# Patient Record
Sex: MALE | HISPANIC OR LATINO | Employment: STUDENT | URBAN - METROPOLITAN AREA
[De-identification: names, ages, dates, MRNs, and addresses within clinical notes are randomized per-mention and may not be internally consistent; named-entity substitution may affect disease eponyms.]

---
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
## 2022-02-17 ENCOUNTER — TELEPHONE (OUTPATIENT)
Dept: CARDIOLOGY | Facility: CLINIC | Age: 22
End: 2022-02-17
Payer: COMMERCIAL

## 2022-02-17 NOTE — TELEPHONE ENCOUNTER
Triage RN attempted to reach patient per request to call him to discuss recent chest discomfort per "[employer]t message. Call was forwarded to a full VM box. Scheduling is attempting reach patient to discuss scheduling a soon appointment. MARY De La Cruz RN.

## 2022-03-16 ENCOUNTER — LAB (OUTPATIENT)
Dept: LAB | Facility: CLINIC | Age: 22
End: 2022-03-16
Payer: COMMERCIAL

## 2022-03-16 ENCOUNTER — OFFICE VISIT (OUTPATIENT)
Dept: CARDIOLOGY | Facility: CLINIC | Age: 22
End: 2022-03-16
Payer: COMMERCIAL

## 2022-03-16 VITALS
OXYGEN SATURATION: 100 % | BODY MASS INDEX: 22.63 KG/M2 | HEART RATE: 67 BPM | DIASTOLIC BLOOD PRESSURE: 74 MMHG | SYSTOLIC BLOOD PRESSURE: 116 MMHG | WEIGHT: 136 LBS

## 2022-03-16 DIAGNOSIS — R00.2 PALPITATIONS: Primary | ICD-10-CM

## 2022-03-16 DIAGNOSIS — R00.2 PALPITATIONS: ICD-10-CM

## 2022-03-16 LAB
ANION GAP SERPL CALCULATED.3IONS-SCNC: 5 MMOL/L (ref 3–14)
BUN SERPL-MCNC: 11 MG/DL (ref 7–30)
CALCIUM SERPL-MCNC: 9 MG/DL (ref 8.5–10.1)
CHLORIDE BLD-SCNC: 103 MMOL/L (ref 98–110)
CO2 SERPL-SCNC: 30 MMOL/L (ref 20–32)
CREAT SERPL-MCNC: 0.74 MG/DL (ref 0.5–1)
ERYTHROCYTE [DISTWIDTH] IN BLOOD BY AUTOMATED COUNT: 12.3 % (ref 10–15)
GFR SERPL CREATININE-BSD FRML MDRD: >90 ML/MIN/1.73M2
GLUCOSE BLD-MCNC: 90 MG/DL (ref 70–99)
HCT VFR BLD AUTO: 41.9 % (ref 40–53)
HGB BLD-MCNC: 13.8 G/DL (ref 13.3–17.7)
MCH RBC QN AUTO: 31 PG (ref 26.5–33)
MCHC RBC AUTO-ENTMCNC: 32.9 G/DL (ref 31.5–36.5)
MCV RBC AUTO: 94 FL (ref 78–100)
PLATELET # BLD AUTO: 216 10E3/UL (ref 150–450)
POTASSIUM BLD-SCNC: 3.7 MMOL/L (ref 3.4–5.3)
RBC # BLD AUTO: 4.45 10E6/UL (ref 4.4–5.9)
SODIUM SERPL-SCNC: 138 MMOL/L (ref 133–144)
WBC # BLD AUTO: 3.8 10E3/UL (ref 4–11)

## 2022-03-16 PROCEDURE — 80048 BASIC METABOLIC PNL TOTAL CA: CPT | Performed by: PHYSICIAN ASSISTANT

## 2022-03-16 PROCEDURE — 99214 OFFICE O/P EST MOD 30 MIN: CPT | Performed by: PHYSICIAN ASSISTANT

## 2022-03-16 PROCEDURE — 36415 COLL VENOUS BLD VENIPUNCTURE: CPT | Performed by: PHYSICIAN ASSISTANT

## 2022-03-16 PROCEDURE — 85027 COMPLETE CBC AUTOMATED: CPT | Performed by: PHYSICIAN ASSISTANT

## 2022-03-16 NOTE — PROGRESS NOTES
Primary Cardiologist: Dr. Garcia    Reason for Visit: Palpitations, SOB, and lightheadedness    History of Present Illness:   Donald is a pleasant 21 year old male with past medical history notable for anxiety.  He was referred to Dr. Garcia in 9/2021 with atypical chest pain.  At that time he had an unremarkable work-up.  His echocardiogram showed no structural abnormalities.  Zio patch monitor showed baseline rhythm of normal sinus rhythm.  During the times he was symptomatic there appeared to be no arrhythmias noted.  His baseline blood work was unremarkable.  He was referred back to PCP for noncardiac causes of his symptoms.    Donald presents to clinic today for further discussion of palpitations, shortness of breath, lightheadedness.  He tells me that despite being on medications for anxiety his symptoms have not changed or improved in any way.  Emotionally he feels like he is at a better place but he continues to have physical symptoms.  His symptoms can come on anytime with no particular pattern.  He mainly reports shortness of breath, lightheadedness, and palpitations.  He feels like his heart was racing.  He does not have chest discomfort.  He denies any bleeding issues or syncope.  He denies binge drinking, tobacco use, or drug use.  He is currently attending [employer] studying biology.  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  He is quite busy with his class work and feels that he is under a great deal of stress.  Overall he feels like his symptoms are not getting worse but they are not improving despite him receiving therapy for anxiety.  He would like to make sure that he does not have any type of arrhythmia.  He does not have first-degree family history of heart conditions but tells me that 2 of his great aunts passed away due to heart related conditions.  They were living in [place] at the time and is hard to know what the specific details were.    Assessment and  Plan:  Donald is a pleasant 21 year old male with past medical history notable for anxiety, GERD, and persistent symptoms of shortness of breath, palpitations, and lightheadedness.    His symptoms can come on at any time.  There is no particular association to postural changes.  He has no significant risk factors.  He had unremarkable work-up about 6 months ago.  We have completed orthostatic vitals during today's visit.  He did not have significant drop in the systolic blood pressure with diastolic blood pressure from supine position to sitting and standing.  His orthostatic pulse did not show a significant rise from supine to standing as well.  There is a possibility that he may have postural orthostatic tachycardia syndrome but again his symptoms are not postural.  At this time we will repeat a 14-day Zio patch monitor to capture these episodes.  I will also have him stop by lab today for baseline blood work.     35 minutes spent on the date of the encounter with chart review, patient visit, care coordination, and documentation.      This note was completed in part using Dragon voice recognition software. Although reviewed after completion, some word and grammatical errors may occur.    Orders this Visit:  Orders Placed This Encounter   Procedures     Basic metabolic panel     CBC with platelets     Leadless EKG Monitor 8 to 14 Days     No orders of the defined types were placed in this encounter.    There are no discontinued medications.      Encounter Diagnosis   Name Primary?     Palpitations Yes       CURRENT MEDICATIONS:  Current Outpatient Medications   Medication Sig Dispense Refill     ALLERGY RELIEF 180 MG tablet Take 180 mg by mouth daily       citalopram (CELEXA) 20 MG tablet Take 1 tablet (20 mg) by mouth daily 90 tablet 1       ALLERGIES     Allergies   Allergen Reactions     Chlorhexidine Rash     Dust Mite Extract Rash       PAST MEDICAL HISTORY:  No past medical history on file.    PAST SURGICAL  HISTORY:  No past surgical history on file.    FAMILY HISTORY:  No family history on file.    SOCIAL HISTORY:  Social History     Socioeconomic History     Marital status: Single     Spouse name: None     Number of children: None     Years of education: None     Highest education level: None   Occupational History     None   Tobacco Use     Smoking status: Never Smoker     Smokeless tobacco: Never Used   Substance and Sexual Activity     Alcohol use: Never     Drug use: Never     Sexual activity: None   Other Topics Concern     Parent/sibling w/ CABG, MI or angioplasty before 65F 55M? Not Asked   Social History Narrative     None     Social Determinants of Health     Financial Resource Strain: Not on file   Food Insecurity: Not on file   Transportation Needs: Not on file   Physical Activity: Not on file   Stress: Not on file   Social Connections: Not on file   Intimate Partner Violence: Not on file   Housing Stability: Not on file       Review of Systems:  Skin:  Negative     Eyes:  Positive for glasses  ENT:  Negative    Respiratory:  Positive for dyspnea on exertion;dyspnea at rest  Cardiovascular:    palpitations;Positive for;chest pain;heaviness;fatigue;lightheadedness;dizziness  Gastroenterology: Positive for heartburn;nausea  Genitourinary:  Negative    Musculoskeletal:  Negative    Neurologic:  Negative    Psychiatric:  Positive for depression;anxiety  Heme/Lymph/Imm:  Positive for allergies  Endocrine:  Negative      Physical Exam:  Vitals: /73   Pulse 78   Wt 61.7 kg (136 lb)   SpO2 100%   BMI 22.63 kg/m       GEN:  NAD  NECK: No JVD  C/V:  Regular rate and rhythm, no murmur, rub or gallop.  RESP: Clear to auscultation bilaterally.  GI: Abdomen soft, nontender, nondistended.   EXTREM: No pitting LE edema.   NEURO: Alert and oriented, cooperative. No obvious focal deficits.   PSYCH: Normal affect.  SKIN: Warm and dry.       Recent Lab Results:  LIPID RESULTS:  Lab Results   Component Value Date     CHOL 125 07/06/2021    HDL 40 (L) 07/06/2021    LDL 72 07/06/2021    TRIG 63 07/06/2021       LIVER ENZYME RESULTS:  Lab Results   Component Value Date    AST 14 07/05/2021    ALT 25 07/05/2021       CBC RESULTS:  Lab Results   Component Value Date    WBC 5.7 09/17/2021    WBC 8.0 07/05/2021    RBC 4.75 09/17/2021    RBC 5.00 07/05/2021    HGB 14.5 09/17/2021    HGB 15.6 07/05/2021    HCT 43.3 09/17/2021    HCT 45.8 07/05/2021    MCV 91 09/17/2021    MCV 92 07/05/2021    MCH 30.5 09/17/2021    MCH 31.2 07/05/2021    MCHC 33.5 09/17/2021    MCHC 34.1 07/05/2021    RDW 12.5 09/17/2021    RDW 12.2 07/05/2021     09/17/2021     07/05/2021       BMP RESULTS:  Lab Results   Component Value Date     09/17/2021     07/05/2021    POTASSIUM 3.6 09/17/2021    POTASSIUM 3.8 07/05/2021    CHLORIDE 108 09/17/2021    CHLORIDE 105 07/05/2021    CO2 30 09/17/2021    CO2 29 07/05/2021    ANIONGAP 1 (L) 09/17/2021    ANIONGAP 4 07/05/2021    GLC 91 09/17/2021    GLC 80 07/05/2021    BUN 7 09/17/2021    BUN 7 07/05/2021    CR 0.90 09/17/2021    CR 0.85 07/05/2021    GFRESTIMATED >90 09/17/2021    GFRESTIMATED >90 07/05/2021    GFRESTBLACK >90 07/05/2021    NAVEED 9.7 09/17/2021    NAVEED 9.3 07/05/2021        A1C RESULTS:  No results found for: A1C    INR RESULTS:  No results found for: INR        Jihan Farrell PA-C  March 16, 2022

## 2022-03-16 NOTE — LETTER
3/16/2022    Efren Alvarado MD  [address] [facility] [address]    RE: Donald Laughlin       Dear Colleague,     I had the pleasure of seeing Donald Laughlin in the [facility].  Primary Cardiologist: Dr. Garcia    Reason for Visit: Palpitations, SOB, and lightheadedness    History of Present Illness:   Donald is a pleasant 21 year old male with past medical history notable for anxiety.  He was referred to Dr. Garcia in 9/2021 with atypical chest pain.  At that time he had an unremarkable work-up.  His echocardiogram showed no structural abnormalities.  Zio patch monitor showed baseline rhythm of normal sinus rhythm.  During the times he was symptomatic there appeared to be no arrhythmias noted.  His baseline blood work was unremarkable.  He was referred back to PCP for noncardiac causes of his symptoms.    Donald presents to clinic today for further discussion of palpitations, shortness of breath, lightheadedness.  He tells me that despite being on medications for anxiety his symptoms have not changed or improved in any way.  Emotionally he feels like he is at a better place but he continues to have physical symptoms.  His symptoms can come on anytime with no particular pattern.  He mainly reports shortness of breath, lightheadedness, and palpitations.  He feels like his heart was racing.  He does not have chest discomfort.  He denies any bleeding issues or syncope.  He denies binge drinking, tobacco use, or drug use.  He is currently attending [employer] studying biology.  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  He is quite busy with his class work and feels that he is under a great deal of stress.  Overall he feels like his symptoms are not getting worse but they are not improving despite him receiving therapy for anxiety.  He would like to make sure that he does not have any type of arrhythmia.  He does not have first-degree family history of  heart conditions but tells me that 2 of his great aunts passed away due to heart related conditions.  They were living in [place] at the time and is hard to know what the specific details were.    Assessment and Plan:  Donald is a pleasant 21 year old male with past medical history notable for anxiety, GERD, and persistent symptoms of shortness of breath, palpitations, and lightheadedness.    His symptoms can come on at any time.  There is no particular association to postural changes.  He has no significant risk factors.  He had unremarkable work-up about 6 months ago.  We have completed orthostatic vitals during today's visit.  He did not have significant drop in the systolic blood pressure with diastolic blood pressure from supine position to sitting and standing.  His orthostatic pulse did not show a significant rise from supine to standing as well.  There is a possibility that he may have postural orthostatic tachycardia syndrome but again his symptoms are not postural.  At this time we will repeat a 14-day Zio patch monitor to capture these episodes.  I will also have him stop by lab today for baseline blood work.     35 minutes spent on the date of the encounter with chart review, patient visit, care coordination, and documentation.      This note was completed in part using Dragon voice recognition software. Although reviewed after completion, some word and grammatical errors may occur.    Orders this Visit:  Orders Placed This Encounter   Procedures     Basic metabolic panel     CBC with platelets     Leadless EKG Monitor 8 to 14 Days     No orders of the defined types were placed in this encounter.    There are no discontinued medications.      Encounter Diagnosis   Name Primary?     Palpitations Yes       CURRENT MEDICATIONS:  Current Outpatient Medications   Medication Sig Dispense Refill     ALLERGY RELIEF 180 MG tablet Take 180 mg by mouth daily       citalopram (CELEXA) 20 MG tablet Take 1 tablet (20  mg) by mouth daily 90 tablet 1       ALLERGIES     Allergies   Allergen Reactions     Chlorhexidine Rash     Dust Mite Extract Rash       PAST MEDICAL HISTORY:  No past medical history on file.    PAST SURGICAL HISTORY:  No past surgical history on file.    FAMILY HISTORY:  No family history on file.    SOCIAL HISTORY:  Social History     Socioeconomic History     Marital status: Single     Spouse name: None     Number of children: None     Years of education: None     Highest education level: None   Occupational History     None   Tobacco Use     Smoking status: Never Smoker     Smokeless tobacco: Never Used   Substance and Sexual Activity     Alcohol use: Never     Drug use: Never     Sexual activity: None   Other Topics Concern     Parent/sibling w/ CABG, MI or angioplasty before 65F 55M? Not Asked   Social History Narrative     None     Social Determinants of Health     Financial Resource Strain: Not on file   Food Insecurity: Not on file   Transportation Needs: Not on file   Physical Activity: Not on file   Stress: Not on file   Social Connections: Not on file   Intimate Partner Violence: Not on file   Housing Stability: Not on file       Review of Systems:  Skin:  Negative     Eyes:  Positive for glasses  ENT:  Negative    Respiratory:  Positive for dyspnea on exertion;dyspnea at rest  Cardiovascular:    palpitations;Positive for;chest pain;heaviness;fatigue;lightheadedness;dizziness  Gastroenterology: Positive for heartburn;nausea  Genitourinary:  Negative    Musculoskeletal:  Negative    Neurologic:  Negative    Psychiatric:  Positive for depression;anxiety  Heme/Lymph/Imm:  Positive for allergies  Endocrine:  Negative      Physical Exam:  Vitals: /73   Pulse 78   Wt 61.7 kg (136 lb)   SpO2 100%   BMI 22.63 kg/m       GEN:  NAD  NECK: No JVD  C/V:  Regular rate and rhythm, no murmur, rub or gallop.  RESP: Clear to auscultation bilaterally.  GI: Abdomen soft, nontender, nondistended.   EXTREM: No  pitting LE edema.   NEURO: Alert and oriented, cooperative. No obvious focal deficits.   PSYCH: Normal affect.  SKIN: Warm and dry.       Recent Lab Results:  LIPID RESULTS:  Lab Results   Component Value Date    CHOL 125 07/06/2021    HDL 40 (L) 07/06/2021    LDL 72 07/06/2021    TRIG 63 07/06/2021       LIVER ENZYME RESULTS:  Lab Results   Component Value Date    AST 14 07/05/2021    ALT 25 07/05/2021       CBC RESULTS:  Lab Results   Component Value Date    WBC 5.7 09/17/2021    WBC 8.0 07/05/2021    RBC 4.75 09/17/2021    RBC 5.00 07/05/2021    HGB 14.5 09/17/2021    HGB 15.6 07/05/2021    HCT 43.3 09/17/2021    HCT 45.8 07/05/2021    MCV 91 09/17/2021    MCV 92 07/05/2021    MCH 30.5 09/17/2021    MCH 31.2 07/05/2021    MCHC 33.5 09/17/2021    MCHC 34.1 07/05/2021    RDW 12.5 09/17/2021    RDW 12.2 07/05/2021     09/17/2021     07/05/2021       BMP RESULTS:  Lab Results   Component Value Date     09/17/2021     07/05/2021    POTASSIUM 3.6 09/17/2021    POTASSIUM 3.8 07/05/2021    CHLORIDE 108 09/17/2021    CHLORIDE 105 07/05/2021    CO2 30 09/17/2021    CO2 29 07/05/2021    ANIONGAP 1 (L) 09/17/2021    ANIONGAP 4 07/05/2021    GLC 91 09/17/2021    GLC 80 07/05/2021    BUN 7 09/17/2021    BUN 7 07/05/2021    CR 0.90 09/17/2021    CR 0.85 07/05/2021    GFRESTIMATED >90 09/17/2021    GFRESTIMATED >90 07/05/2021    GFRESTBLACK >90 07/05/2021    NAVEED 9.7 09/17/2021    NAVEED 9.3 07/05/2021        A1C RESULTS:  No results found for: A1C    INR RESULTS:  No results found for: INR    Aybike Bakan-Carter, PA-C  March 16, 2022     Thank you for allowing me to participate in the care of your patient.      Sincerely,     Jihan Farrell PA-C     [facility]  cc:   Korin Garcia MD  [address] [address]

## 2022-03-16 NOTE — PATIENT INSTRUCTIONS
Today's Plan:   1) Schedule 14-day heart monitor to find out if any of your episodes are concerning for a heart rhythm issue.   2) I will send message on [employer] about your results.     If you have questions or concerns please call my nurse team at [phone].     Scheduling phone number: [phone].  Reminder: Please bring in all current medications, over the counter supplements and vitamin bottles to your next appointment.    It was a pleasure seeing you today!     Jihan Farrell PA-C  3/16/2022

## 2022-03-22 ENCOUNTER — HOSPITAL ENCOUNTER (OUTPATIENT)
Dept: CARDIOLOGY | Facility: CLINIC | Age: 22
Discharge: HOME | End: 2022-03-22
Attending: PHYSICIAN ASSISTANT | Admitting: PHYSICIAN ASSISTANT
Payer: COMMERCIAL

## 2022-03-22 DIAGNOSIS — R00.2 PALPITATIONS: ICD-10-CM

## 2022-03-22 PROCEDURE — 93248 EXT ECG>7D<15D REV&INTERPJ: CPT | Performed by: INTERNAL MEDICINE

## 2022-03-22 PROCEDURE — 93246 EXT ECG>7D<15D RECORDING: CPT

## 2022-03-28 ENCOUNTER — IMMUNIZATION (OUTPATIENT)
Dept: NURSING | Facility: CLINIC | Age: 22
End: 2022-03-28
Payer: COMMERCIAL

## 2022-03-28 PROCEDURE — 91305 COVID-19,PF,PFIZER (12+ YRS): CPT

## 2022-03-28 PROCEDURE — 0054A COVID-19,PF,PFIZER (12+ YRS): CPT

## 2022-04-24 ENCOUNTER — MYC MEDICAL ADVICE (OUTPATIENT)
Dept: CARDIOLOGY | Facility: CLINIC | Age: 22
End: 2022-04-24
Payer: COMMERCIAL

## 2022-04-25 NOTE — TELEPHONE ENCOUNTER
"Mychart message from patient routed to [employer]. Hx palpitations/lightheadedness/dyspnea.    \"Hi,     I noticed my white blood cell count was lower than the range, could this have anything to do with my symptoms? Or any explanation why I get chest pain, shortness of breath, dizziness, palpitations even though all my cardiac tests come back good?\"   "

## 2022-04-27 ENCOUNTER — ALLIED HEALTH/NURSE VISIT (OUTPATIENT)
Dept: INTERNAL MEDICINE | Facility: CLINIC | Age: 22
End: 2022-04-27
Payer: COMMERCIAL

## 2022-04-27 DIAGNOSIS — Z23 NEED FOR TDAP VACCINATION: Primary | ICD-10-CM

## 2022-04-27 PROCEDURE — 90715 TDAP VACCINE 7 YRS/> IM: CPT

## 2022-04-27 PROCEDURE — 90471 IMMUNIZATION ADMIN: CPT

## 2022-04-27 PROCEDURE — 99207 PR NO CHARGE NURSE ONLY: CPT

## 2022-04-27 NOTE — NURSING NOTE
Prior to immunization administration, verified patients identity using patient s name and date of birth. Please see Immunization Activity for additional information.     Screening Questionnaire for Adult Immunization    Are you sick today?   No   Do you have allergies to medications, food, a vaccine component or latex?   No   Have you ever had a serious reaction after receiving a vaccination?   No   Do you have a long-term health problem with heart, lung, kidney, or metabolic disease (e.g., diabetes), asthma, a blood disorder, no spleen, complement component deficiency, a cochlear implant, or a spinal fluid leak?  Are you on long-term aspirin therapy?   No   Do you have cancer, leukemia, HIV/AIDS, or any other immune system problem?   No   Do you have a parent, brother, or sister with an immune system problem?   No   In the past 3 months, have you taken medications that affect  your immune system, such as prednisone, other steroids, or anticancer drugs; drugs for the treatment of rheumatoid arthritis, Crohn s disease, or psoriasis; or have you had radiation treatments?   No   Have you had a seizure, or a brain or other nervous system problem?   No   During the past year, have you received a transfusion of blood or blood    products, or been given immune (gamma) globulin or antiviral drug?   No   For women: Are you pregnant or is there a chance you could become       pregnant during the next month?   No   Have you received any vaccinations in the past 4 weeks?   No     Immunization questionnaire answers were all negative.        Per orders of Dr. Alvarado, injection of TDAP given by Emily Waggoner. Patient instructed to remain in clinic for 15 minutes afterwards, and to report any adverse reaction to me immediately.       Screening performed by Emily Waggoner on 4/27/2022 at 3:07 PM.

## 2022-04-27 NOTE — TELEPHONE ENCOUNTER
Jihan's reply routed to patient via Jihan Berry PA-C  You 8 minutes ago (2:32 PM)     Monitor was normal during the times he had these symptoms. This could be anxiety- recommend he follows with PCP about this as well as questions on white count.     Thanks,     Jihan

## 2022-06-27 DIAGNOSIS — F41.1 GAD (GENERALIZED ANXIETY DISORDER): ICD-10-CM

## 2022-06-29 RX ORDER — CITALOPRAM HYDROBROMIDE 20 MG/1
TABLET ORAL
Qty: 90 TABLET | Refills: 0 | Status: SHIPPED | OUTPATIENT
Start: 2022-06-29 | End: 2022-10-23

## 2022-07-19 ENCOUNTER — OFFICE VISIT (OUTPATIENT)
Dept: PODIATRY | Facility: CLINIC | Age: 22
End: 2022-07-19
Payer: COMMERCIAL

## 2022-07-19 VITALS
SYSTOLIC BLOOD PRESSURE: 114 MMHG | BODY MASS INDEX: 21.66 KG/M2 | DIASTOLIC BLOOD PRESSURE: 70 MMHG | WEIGHT: 130 LBS | HEIGHT: 65 IN

## 2022-07-19 DIAGNOSIS — B35.1 ONYCHOMYCOSIS: Primary | ICD-10-CM

## 2022-07-19 PROCEDURE — 99203 OFFICE O/P NEW LOW 30 MIN: CPT | Performed by: PODIATRIST

## 2022-07-19 NOTE — PROGRESS NOTES
"Assessment:      ICD-10-CM    1. Onychomycosis  B35.1 Adult Dermatology Referral          Plan:  Orders Placed This Encounter   Procedures     Adult Dermatology Referral       Discussed the etiology and treatment of the condition with the patient.  Discussed treatment options.    Recommend referral to dermatology for definitive biopsy & treatment.    I can remove permanently     Return:  Dermatology / foot care nurse     Pily Khan DPM                Chief Complaint:     Patient presents with:  Right Foot - Toe Pain     Fungus    HPI:  Donald Laughlin is a 22 year old year old male who presents for evaluation of fungus.    Pt states he has tried nothing, has not been to his PCP      Past Medical & Surgical History:  No past medical history on file.   No past surgical history on file.   No family history on file.     Social History:  ?  History   Smoking Status     Never Smoker   Smokeless Tobacco     Never Used     History   Drug Use Unknown     Social History    Substance and Sexual Activity      Alcohol use: Never      Allergies:  ?   Allergies   Allergen Reactions     Chlorhexidine Rash     Dust Mite Extract Rash        Medications:    Current Outpatient Medications   Medication     ALLERGY RELIEF 180 MG tablet     citalopram (CELEXA) 20 MG tablet     No current facility-administered medications for this visit.       Physical Exam:  ?  Vitals:  /70   Ht 1.651 m (5' 5\")   Wt 59 kg (130 lb)   BMI 21.63 kg/m     General:  WD/WN, in NAD.  A&O x3.  Dermatologic:    Skin is intact, open lesions absent.  Fungus present  bilateral.  Vascular:  Pulses palpable bilateral.  Digital capillary refill time normal bilateral.  Generalized edema- none bilateral.  Neurologic:    Gross sensation normal.  Gait and balance normal.  Musculoskeletal:  No pain to palpation of foot & ankle.  aROM intact to foot & ankle.  Muscle strength 5/5 foot & ankle.              "

## 2022-07-19 NOTE — PATIENT INSTRUCTIONS
PATIENT INSTRUCTIONS - Podiatry / Foot & Ankle Surgery    Dermatology referral today for fungus treatment      I can permanently remove the nail if desired

## 2022-07-19 NOTE — LETTER
"    7/19/2022         RE: Donald Laughlin  [address] [facility] [address]        Dear Colleague,    Thank you for referring your patient, Donald Laughlin, to the [facility]. Please see a copy of my visit note below.    Assessment:      ICD-10-CM    1. Onychomycosis  B35.1 Adult Dermatology Referral          Plan:  Orders Placed This Encounter   Procedures     Adult Dermatology Referral       Discussed the etiology and treatment of the condition with the patient.  Discussed treatment options.    Recommend referral to dermatology for definitive biopsy & treatment.    I can remove permanently     Return:  Dermatology / foot care nurse     Pily Khan DPM                Chief Complaint:     Patient presents with:  Right Foot - Toe Pain     Fungus    HPI:  Donald Laughlin is a 22 year old year old male who presents for evaluation of fungus.    Pt states he has tried nothing, has not been to his PCP      Past Medical & Surgical History:  No past medical history on file.   No past surgical history on file.   No family history on file.     Social History:  ?  History   Smoking Status     Never Smoker   Smokeless Tobacco     Never Used     History   Drug Use Unknown     Social History    Substance and Sexual Activity      Alcohol use: Never      Allergies:  ?   Allergies   Allergen Reactions     Chlorhexidine Rash     Dust Mite Extract Rash        Medications:    Current Outpatient Medications   Medication     ALLERGY RELIEF 180 MG tablet     citalopram (CELEXA) 20 MG tablet     No current facility-administered medications for this visit.       Physical Exam:  ?  Vitals:  /70   Ht 1.651 m (5' 5\")   Wt 59 kg (130 lb)   BMI 21.63 kg/m     General:  WD/WN, in NAD.  A&O x3.  Dermatologic:    Skin is intact, open lesions absent.  Fungus present  bilateral.  Vascular:  Pulses palpable bilateral.  Digital capillary refill time normal bilateral.  Generalized " edema- none bilateral.  Neurologic:    Gross sensation normal.  Gait and balance normal.  Musculoskeletal:  No pain to palpation of foot & ankle.  aROM intact to foot & ankle.  Muscle strength 5/5 foot & ankle.                  Again, thank you for allowing me to participate in the care of your patient.        Sincerely,        Pily Khan DPM

## 2022-07-27 ENCOUNTER — OFFICE VISIT (OUTPATIENT)
Dept: INTERNAL MEDICINE | Facility: CLINIC | Age: 22
End: 2022-07-27
Payer: COMMERCIAL

## 2022-07-27 VITALS
OXYGEN SATURATION: 96 % | HEART RATE: 74 BPM | WEIGHT: 134.2 LBS | SYSTOLIC BLOOD PRESSURE: 113 MMHG | TEMPERATURE: 98.6 F | DIASTOLIC BLOOD PRESSURE: 73 MMHG | BODY MASS INDEX: 22.36 KG/M2 | HEIGHT: 65 IN

## 2022-07-27 DIAGNOSIS — R35.0 URINARY FREQUENCY: ICD-10-CM

## 2022-07-27 DIAGNOSIS — M25.552 PELVIC JOINT PAIN, LEFT: Primary | ICD-10-CM

## 2022-07-27 DIAGNOSIS — N50.812 PAIN IN LEFT TESTICLE: ICD-10-CM

## 2022-07-27 LAB
ALBUMIN UR-MCNC: NEGATIVE MG/DL
APPEARANCE UR: CLEAR
BILIRUB UR QL STRIP: NEGATIVE
COLOR UR AUTO: YELLOW
GLUCOSE UR STRIP-MCNC: NEGATIVE MG/DL
HGB UR QL STRIP: NEGATIVE
KETONES UR STRIP-MCNC: NEGATIVE MG/DL
LEUKOCYTE ESTERASE UR QL STRIP: NEGATIVE
NITRATE UR QL: NEGATIVE
PH UR STRIP: 7.5 [PH] (ref 5–7)
SP GR UR STRIP: 1.01 (ref 1–1.03)
UROBILINOGEN UR STRIP-ACNC: 0.2 E.U./DL

## 2022-07-27 PROCEDURE — 87591 N.GONORRHOEAE DNA AMP PROB: CPT | Performed by: INTERNAL MEDICINE

## 2022-07-27 PROCEDURE — 99214 OFFICE O/P EST MOD 30 MIN: CPT | Performed by: INTERNAL MEDICINE

## 2022-07-27 PROCEDURE — 87491 CHLMYD TRACH DNA AMP PROBE: CPT | Performed by: INTERNAL MEDICINE

## 2022-07-27 PROCEDURE — 81003 URINALYSIS AUTO W/O SCOPE: CPT | Performed by: INTERNAL MEDICINE

## 2022-07-27 NOTE — PROGRESS NOTES
"  ASSESSMENT/PLAN                                                      (M25.552) Pelvic joint pain, left  (primary encounter diagnosis)  (R35.0) Urinary frequency  (N50.812) Pain in left testicle  Comment: normal testicular exam; possible hernia palpated left inguinal canal with Valsalva.  Plan: Urine GC/CT and UA reflex today; if these are unrevealing recommend hernia ultrasound for further evaluation.    Joeclyne Rainey MD   [place] [address] [address] [address]  T: [phone], F: [phone]    SUBJECTIVE                                                      Donald Laughlin is a very pleasant 22 year old male who presents pain and urinary symptoms:    Symptoms started ~1 week ago.  Left pelvic discomfort with urinating and urinating more frequently than normal.  Had left testicular pain yesterday but that has since resolved.      No urinary urgency, dysuria, or hematuria.   No urethral discharge.   No fevers or chills.  No anorexia, unintentional weight loss, or night sweats.  No rashes or skin lesions.    No history of abdominal or pelvic surgeries.  Sexually active but not recently. No history of or known exposure to STIs.    OBJECTIVE                                                      /73 (BP Location: Left arm, Cuff Size: Adult Regular)   Pulse 74   Temp 98.6  F (37  C) (Oral)   Ht 1.651 m (5' 5\")   Wt 60.9 kg (134 lb 3.2 oz)   SpO2 96%   BMI 22.33 kg/m    Constitutional: well-appearing  Genitourinary: normal scrotum and penis; small bulge felt with Valsalva left inguinal canal  ---    (Note documentation was completed, in part, with [employer] voice-recognition software. Documentation was reviewed, but some grammatical, spelling, and word errors may remain.)      "

## 2022-07-28 ENCOUNTER — MYC MEDICAL ADVICE (OUTPATIENT)
Dept: INTERNAL MEDICINE | Facility: CLINIC | Age: 22
End: 2022-07-28

## 2022-07-28 LAB
C TRACH DNA SPEC QL NAA+PROBE: NEGATIVE
N GONORRHOEA DNA SPEC QL NAA+PROBE: NEGATIVE

## 2022-07-29 ENCOUNTER — HOSPITAL ENCOUNTER (OUTPATIENT)
Dept: ULTRASOUND IMAGING | Facility: CLINIC | Age: 22
Discharge: HOME | End: 2022-07-29
Attending: INTERNAL MEDICINE | Admitting: INTERNAL MEDICINE
Payer: COMMERCIAL

## 2022-07-29 DIAGNOSIS — N50.812 PAIN IN LEFT TESTICLE: ICD-10-CM

## 2022-07-29 DIAGNOSIS — M25.552 PELVIC JOINT PAIN, LEFT: ICD-10-CM

## 2022-07-29 DIAGNOSIS — R35.0 URINARY FREQUENCY: ICD-10-CM

## 2022-07-29 PROCEDURE — 76705 ECHO EXAM OF ABDOMEN: CPT

## 2022-08-02 NOTE — TELEPHONE ENCOUNTER
Please see mychart from patient and advise appropriate course of action.      Patient okay with CT or referral for urologist     Paul Ribeiro RN  [facility] Triage Nurse

## 2022-08-16 ENCOUNTER — OFFICE VISIT (OUTPATIENT)
Dept: INTERNAL MEDICINE | Facility: CLINIC | Age: 22
End: 2022-08-16
Payer: COMMERCIAL

## 2022-08-16 VITALS
HEART RATE: 72 BPM | SYSTOLIC BLOOD PRESSURE: 104 MMHG | OXYGEN SATURATION: 97 % | DIASTOLIC BLOOD PRESSURE: 69 MMHG | WEIGHT: 133.8 LBS | BODY MASS INDEX: 22.29 KG/M2 | HEIGHT: 65 IN | TEMPERATURE: 98.3 F

## 2022-08-16 DIAGNOSIS — F41.9 ANXIETY: ICD-10-CM

## 2022-08-16 DIAGNOSIS — Z13.6 CARDIOVASCULAR SCREENING; LDL GOAL LESS THAN 160: ICD-10-CM

## 2022-08-16 DIAGNOSIS — Z00.00 ROUTINE GENERAL MEDICAL EXAMINATION AT A HEALTH CARE FACILITY: Primary | ICD-10-CM

## 2022-08-16 LAB
ALBUMIN SERPL-MCNC: 4.3 G/DL (ref 3.4–5)
ALP SERPL-CCNC: 81 U/L (ref 40–150)
ALT SERPL W P-5'-P-CCNC: 15 U/L (ref 0–70)
AST SERPL W P-5'-P-CCNC: 15 U/L (ref 0–45)
BILIRUB DIRECT SERPL-MCNC: 0.2 MG/DL (ref 0–0.2)
BILIRUB SERPL-MCNC: 1 MG/DL (ref 0.2–1.3)
CHOLEST SERPL-MCNC: 122 MG/DL
FASTING STATUS PATIENT QL REPORTED: YES
HDLC SERPL-MCNC: 46 MG/DL
LDLC SERPL CALC-MCNC: 66 MG/DL
NONHDLC SERPL-MCNC: 76 MG/DL
PROT SERPL-MCNC: 7.6 G/DL (ref 6.8–8.8)
TRIGL SERPL-MCNC: 52 MG/DL

## 2022-08-16 PROCEDURE — 80061 LIPID PANEL: CPT | Performed by: INTERNAL MEDICINE

## 2022-08-16 PROCEDURE — 36415 COLL VENOUS BLD VENIPUNCTURE: CPT | Performed by: INTERNAL MEDICINE

## 2022-08-16 PROCEDURE — 99395 PREV VISIT EST AGE 18-39: CPT | Performed by: INTERNAL MEDICINE

## 2022-08-16 PROCEDURE — 80076 HEPATIC FUNCTION PANEL: CPT | Performed by: INTERNAL MEDICINE

## 2022-08-16 ASSESSMENT — ANXIETY QUESTIONNAIRES
5. BEING SO RESTLESS THAT IT IS HARD TO SIT STILL: NOT AT ALL
7. FEELING AFRAID AS IF SOMETHING AWFUL MIGHT HAPPEN: NOT AT ALL
IF YOU CHECKED OFF ANY PROBLEMS ON THIS QUESTIONNAIRE, HOW DIFFICULT HAVE THESE PROBLEMS MADE IT FOR YOU TO DO YOUR WORK, TAKE CARE OF THINGS AT HOME, OR GET ALONG WITH OTHER PEOPLE: NOT DIFFICULT AT ALL
2. NOT BEING ABLE TO STOP OR CONTROL WORRYING: NOT AT ALL
GAD7 TOTAL SCORE: 3
6. BECOMING EASILY ANNOYED OR IRRITABLE: NOT AT ALL
GAD7 TOTAL SCORE: 3
3. WORRYING TOO MUCH ABOUT DIFFERENT THINGS: SEVERAL DAYS
1. FEELING NERVOUS, ANXIOUS, OR ON EDGE: SEVERAL DAYS

## 2022-08-16 ASSESSMENT — PATIENT HEALTH QUESTIONNAIRE - PHQ9
SUM OF ALL RESPONSES TO PHQ QUESTIONS 1-9: 0
5. POOR APPETITE OR OVEREATING: SEVERAL DAYS

## 2022-08-16 ASSESSMENT — ENCOUNTER SYMPTOMS
ARTHRALGIAS: 0
HEARTBURN: 0
HEMATOCHEZIA: 0
PALPITATIONS: 0
NERVOUS/ANXIOUS: 1
MYALGIAS: 0
COUGH: 0
FEVER: 0
WEAKNESS: 0
PARESTHESIAS: 0
SORE THROAT: 0
DIZZINESS: 0
DYSURIA: 0
SHORTNESS OF BREATH: 0
DIZZINESS: 1
CHILLS: 0
HEMATURIA: 0
SHORTNESS OF BREATH: 1
EYE PAIN: 0
FREQUENCY: 0
JOINT SWELLING: 0
CONSTIPATION: 0
ABDOMINAL PAIN: 0
NAUSEA: 0
DIARRHEA: 0
PALPITATIONS: 1
NAUSEA: 1
HEADACHES: 0

## 2022-08-16 NOTE — PROGRESS NOTES
SUBJECTIVE:   CC: Donald Laughlin is an 22 year old male who presents for preventative health visit.     Patient has been advised of split billing requirements and indicates understanding: Yes  Healthy Habits:     Getting at least 3 servings of Calcium per day:  Yes    Bi-annual eye exam:  NO    Dental care twice a year:  NO    Sleep apnea or symptoms of sleep apnea:  Daytime drowsiness    Diet:  Regular (no restrictions), Breakfast skipped and Other    Frequency of exercise:  1 day/week    Duration of exercise:  Less than 15 minutes    Taking medications regularly:  Yes    Medication side effects:  Not applicable    PHQ-2 Total Score: 0    Additional concerns today:  No      Today's PHQ-2 Score:   PHQ-2 ( 1999 [employer]) 8/16/2022   Q1: Little interest or pleasure in doing things 0   Q2: Feeling down, depressed or hopeless 0   PHQ-2 Score 0   PHQ-2 Total Score (12-17 Years)- Positive if 3 or more points; Administer PHQ-A if positive -   Q1: Little interest or pleasure in doing things Not at all   Q2: Feeling down, depressed or hopeless Not at all   PHQ-2 Score 0       Abuse: Current or Past(Physical, Sexual or Emotional)- No  Do you feel safe in your environment? Yes    Have you ever done Advance Care Planning? (For example, a Health Directive, POLST, or a discussion with a medical provider or your loved ones about your wishes): No, advance care planning information given to patient to review.  Patient plans to discuss their wishes with loved ones or provider.      Social History     Tobacco Use     Smoking status: Never Smoker     Smokeless tobacco: Never Used   Substance Use Topics     Alcohol use: Never       Alcohol Use 8/16/2022   Prescreen: >3 drinks/day or >7 drinks/week? No   No flowsheet data found.    Last PSA: No results found for: PSA    Reviewed orders with patient. Reviewed health maintenance and updated orders accordingly - Yes  Lab work is in process    Reviewed and updated as needed this visit by  "clinical staff    Allergies  Meds                Reviewed and updated as needed this visit by Provider                       Review of Systems   Constitutional: Negative for chills and fever.   HENT: Negative for congestion, ear pain, hearing loss and sore throat.    Eyes: Negative for pain and visual disturbance.   Respiratory: Negative for cough and shortness of breath.    Cardiovascular: Negative for chest pain, palpitations and peripheral edema.   Gastrointestinal: Negative for abdominal pain, constipation, diarrhea, heartburn, hematochezia and nausea.   Genitourinary: Negative for dysuria, frequency, genital sores, hematuria, impotence, penile discharge and urgency.   Musculoskeletal: Negative for arthralgias, joint swelling and myalgias.   Skin: Negative for rash.   Neurological: Negative for dizziness, weakness, headaches and paresthesias.   Psychiatric/Behavioral: Positive for mood changes. The patient is nervous/anxious.        OBJECTIVE:   /69   Pulse 72   Temp 98.3  F (36.8  C) (Oral)   Ht 1.651 m (5' 5\")   Wt 60.7 kg (133 lb 12.8 oz)   SpO2 97%   BMI 22.27 kg/m      Physical Exam  GENERAL: alert and no distress  EYES: Eyes grossly normal to inspection, PERRL and conjunctivae and sclerae normal  HENT: ear canals and TM's normal, nose and mouth without ulcers or lesions  NECK: no adenopathy, no asymmetry, masses, or scars and thyroid normal to palpation  RESP: lungs clear to auscultation - no rales, rhonchi or wheezes  CV: regular rate and rhythm, normal S1 S2, no S3 or S4, no murmur, click or rub, no peripheral edema and peripheral pulses strong  ABDOMEN: soft, nontender, no hepatosplenomegaly, no masses and bowel sounds normal   (male): normal male genitalia without lesions or urethral discharge, no hernia  MS: no gross musculoskeletal defects noted, no edema  NEURO: No focal changes  PSYCH: mentation appears normal, affect normal/bright    ASSESSMENT/PLAN:   (Z00.00) Routine general " "medical examination at a health care facility  (primary encounter diagnosis)  Comment: Recommend annual check, patient appears to be doing well  Plan: Hepatic function panel            (Z13.6) CARDIOVASCULAR SCREENING; LDL GOAL LESS THAN 160  Comment: Recheck lipid panel and hepatic panel.  Plan: Lipid Profile, Hepatic function panel            (F41.9) Anxiety  Comment: Stable per patient doing well on citalopram.  Continue with current dosing as ordered  Plan:     Patient has been advised of split billing requirements and indicates understanding: Yes    COUNSELING:   Reviewed preventive health counseling, as reflected in patient instructions       Regular exercise       Healthy diet/nutrition    Estimated body mass index is 22.27 kg/m  as calculated from the following:    Height as of this encounter: 1.651 m (5' 5\").    Weight as of this encounter: 60.7 kg (133 lb 12.8 oz).         He reports that he has never smoked. He has never used smokeless tobacco.      Counseling Resources:  ATP IV Guidelines  Pooled Cohorts Equation Calculator  FRAX Risk Assessment  ICSI Preventive Guidelines  Dietary Guidelines for Americans, 2010  USDA's MyPlate  ASA Prophylaxis  Lung CA Screening    Efren Alvarado MD  [facility]  "

## 2022-09-04 ENCOUNTER — HEALTH MAINTENANCE LETTER (OUTPATIENT)
Age: 22
End: 2022-09-04

## 2022-10-23 ENCOUNTER — MYC REFILL (OUTPATIENT)
Dept: INTERNAL MEDICINE | Facility: CLINIC | Age: 22
End: 2022-10-23

## 2022-10-23 DIAGNOSIS — F41.1 GAD (GENERALIZED ANXIETY DISORDER): ICD-10-CM

## 2022-10-25 ENCOUNTER — TRANSFERRED RECORDS (OUTPATIENT)
Dept: HEALTH INFORMATION MANAGEMENT | Facility: CLINIC | Age: 22
End: 2022-10-25

## 2022-10-25 RX ORDER — CITALOPRAM HYDROBROMIDE 20 MG/1
20 TABLET ORAL DAILY
Qty: 90 TABLET | Refills: 2 | Status: SHIPPED | OUTPATIENT
Start: 2022-10-25 | End: 2023-07-05

## 2022-10-25 NOTE — TELEPHONE ENCOUNTER
Prescription approved per [facility] Refill Protocol.  Tamara Infante RN

## 2022-11-06 ENCOUNTER — OFFICE VISIT (OUTPATIENT)
Dept: FAMILY MEDICINE | Facility: CLINIC | Age: 22
End: 2022-11-06
Payer: COMMERCIAL

## 2022-11-06 VITALS
BODY MASS INDEX: 23.56 KG/M2 | HEART RATE: 70 BPM | SYSTOLIC BLOOD PRESSURE: 112 MMHG | OXYGEN SATURATION: 97 % | WEIGHT: 141.6 LBS | DIASTOLIC BLOOD PRESSURE: 80 MMHG | RESPIRATION RATE: 18 BRPM | TEMPERATURE: 97.6 F

## 2022-11-06 DIAGNOSIS — R21 RASH: Primary | ICD-10-CM

## 2022-11-06 PROCEDURE — 99203 OFFICE O/P NEW LOW 30 MIN: CPT

## 2022-11-06 RX ORDER — SULFAMETHOXAZOLE/TRIMETHOPRIM 800-160 MG
1 TABLET ORAL 2 TIMES DAILY
Qty: 14 TABLET | Refills: 0 | Status: SHIPPED | OUTPATIENT
Start: 2022-11-06 | End: 2022-11-13

## 2022-11-06 NOTE — PROGRESS NOTES
ICD-10-CM    1. Rash  R21 sulfamethoxazole-trimethoprim (BACTRIM DS) 800-160 MG tablet        Impetiginous appearance with some pustules as well.    PLAN:  Patient Instructions   Bactrim twice daily for the next 7 days.    Wash towels and bed linens in hot water today or tomorrow to reduce risk of spreading infection via these contact routes.    Use hydrocortisone 1% cream or ointment up to twice a day for the next 3 days on your face.  Do not apply this on your eyelids.  Do not use for more than 3 days in a row.    SUBJECTIVE:  Donald Laughlin is a 22 year old male who presents to  today with rash on cheeks since Thursday morning.  Started on right cheek and spread to left cheek and now he's noticed a little bit on bridge of nose between eyes.  No fevers.  Rash is itchy and burning.  No new topical products or other triggers that he can think of.  Was on amoxicillin for dental issue recently--completed course about 2 weeks ago.    OBJECTIVE:  /80 (BP Location: Left arm, Patient Position: Sitting, Cuff Size: Adult Regular)   Pulse 70   Temp 97.6  F (36.4  C) (Tympanic)   Resp 18   Wt 64.2 kg (141 lb 9.6 oz)   SpO2 97%   BMI 23.56 kg/m    GEN: well-appearing, in NAD  SKIN: R cheek with cluster of pustules and crusted areas on erythematous base, L cheek with smaller cluster of crusted areas and a few pustules

## 2022-11-27 ENCOUNTER — OFFICE VISIT (OUTPATIENT)
Dept: FAMILY MEDICINE | Facility: CLINIC | Age: 22
End: 2022-11-27
Payer: COMMERCIAL

## 2022-11-27 VITALS
SYSTOLIC BLOOD PRESSURE: 118 MMHG | DIASTOLIC BLOOD PRESSURE: 64 MMHG | TEMPERATURE: 100.7 F | OXYGEN SATURATION: 98 % | HEART RATE: 104 BPM

## 2022-11-27 DIAGNOSIS — Z20.822 SUSPECTED 2019 NOVEL CORONAVIRUS INFECTION: Primary | ICD-10-CM

## 2022-11-27 DIAGNOSIS — J10.1 INFLUENZA A: ICD-10-CM

## 2022-11-27 DIAGNOSIS — R68.89 FLU-LIKE SYMPTOMS: ICD-10-CM

## 2022-11-27 LAB
FLUAV AG SPEC QL IA: POSITIVE
FLUBV AG SPEC QL IA: NEGATIVE

## 2022-11-27 PROCEDURE — 99213 OFFICE O/P EST LOW 20 MIN: CPT | Mod: CS

## 2022-11-27 PROCEDURE — U0005 INFEC AGEN DETEC AMPLI PROBE: HCPCS | Performed by: NURSE PRACTITIONER

## 2022-11-27 PROCEDURE — 87804 INFLUENZA ASSAY W/OPTIC: CPT

## 2022-11-27 PROCEDURE — U0003 INFECTIOUS AGENT DETECTION BY NUCLEIC ACID (DNA OR RNA); SEVERE ACUTE RESPIRATORY SYNDROME CORONAVIRUS 2 (SARS-COV-2) (CORONAVIRUS DISEASE [COVID-19]), AMPLIFIED PROBE TECHNIQUE, MAKING USE OF HIGH THROUGHPUT TECHNOLOGIES AS DESCRIBED BY CMS-2020-01-R: HCPCS | Performed by: NURSE PRACTITIONER

## 2022-11-27 RX ORDER — OSELTAMIVIR PHOSPHATE 75 MG/1
75 CAPSULE ORAL 2 TIMES DAILY
Qty: 10 CAPSULE | Refills: 0 | Status: SHIPPED | OUTPATIENT
Start: 2022-11-27 | End: 2022-12-02

## 2022-11-27 RX ORDER — FEXOFENADINE HCL 180 MG/1
TABLET ORAL EVERY 24 HOURS
COMMUNITY
Start: 2021-09-30 | End: 2023-01-21

## 2022-11-27 NOTE — PATIENT INSTRUCTIONS
Mucinex is product known to help loosen congestion (generic is guaifenesin)   Delsym 12 hour over the counter works well for cough.  Warm steamy showers. Push fluids.  Follow-up with primary care provider symptoms persist more than 2 weeks or symptoms worsen. A chest X-ray may be needed to diagnose persistent symptoms.  If you develop trouble breathing, swallowing or cough-up blood, present to emergency room.

## 2022-11-27 NOTE — PROGRESS NOTES
Assessment & Plan     Suspected 2019 novel coronavirus infection    - Symptomatic; Yes; 11/25/2022 COVID-19 Virus (Coronavirus) by PCR Nose    Flu-like symptoms    - Influenza A/B antigen    Influenza A    - oseltamivir (TAMIFLU) 75 MG capsule  Dispense: 10 capsule; Refill: 0       Patient Instructions   Mucinex is product known to help loosen congestion (generic is guaifenesin)   Delsym 12 hour over the counter works well for cough.  Warm steamy showers. Push fluids.  Follow-up with primary care provider symptoms persist more than 2 weeks or symptoms worsen. A chest X-ray may be needed to diagnose persistent symptoms.  If you develop trouble breathing, swallowing or cough-up blood, present to emergency room.       Return in about 1 week (around 12/4/2022), or if symptoms worsen or fail to improve.    [facility]    Clemencia Bennett is a 22 year old male who presents to clinic today for the following health issues:  Chief Complaint   Patient presents with     Urgent Care     Cough     3 days with body aces, coughing and on and off HA. OTC medication and has not helped     Patient reports that he started getting sick 2 days ago with a sore throat and a cough. He has also had body aches, chills and sweats. Decreased appetite and some nausea with eating. He has no wheezing, difficulty breathing or shortness of Breath. He has no history of asthma. He has a history of shortness of breath and palpitations for which he is seeing a cardiologist.         Review of Systems        Objective    /64   Pulse 104   Temp (!) 100.7  F (38.2  C) (Tympanic)   SpO2 98%   Physical Exam  Constitutional:       General: He is not in acute distress.     Appearance: Normal appearance. He is normal weight. He is ill-appearing.   HENT:      Head: Normocephalic and atraumatic.      Right Ear: Tympanic membrane, ear canal and external ear normal.       Left Ear: Tympanic membrane, ear canal and external ear normal.      Nose: Congestion and rhinorrhea present.      Mouth/Throat:      Mouth: Mucous membranes are moist.      Pharynx: Oropharynx is clear.   Eyes:      Pupils: Pupils are equal, round, and reactive to light.   Cardiovascular:      Rate and Rhythm: Regular rhythm. Tachycardia present.      Heart sounds: Normal heart sounds.   Pulmonary:      Effort: Pulmonary effort is normal.      Breath sounds: Normal breath sounds.   Lymphadenopathy:      Cervical: Cervical adenopathy present.   Skin:     General: Skin is warm and dry.   Neurological:      Mental Status: He is alert and oriented to person, place, and time.   Psychiatric:         Mood and Affect: Mood normal.         Behavior: Behavior normal.         Thought Content: Thought content normal.         Judgment: Judgment normal.            Results for orders placed or performed in visit on 11/27/22 (from the past 24 hour(s))   Influenza A/B antigen    Specimen: Nose; Swab   Result Value Ref Range    Influenza A antigen Positive (A) Negative    Influenza B antigen Negative Negative    Narrative    Test results must be correlated with clinical data. If necessary, results should be confirmed by a molecular assay or viral culture.

## 2022-11-28 LAB — SARS-COV-2 RNA RESP QL NAA+PROBE: NEGATIVE

## 2023-01-03 ENCOUNTER — OFFICE VISIT (OUTPATIENT)
Dept: CARDIOLOGY | Facility: CLINIC | Age: 23
End: 2023-01-03
Payer: COMMERCIAL

## 2023-01-03 VITALS
BODY MASS INDEX: 24.32 KG/M2 | HEIGHT: 65 IN | HEART RATE: 88 BPM | OXYGEN SATURATION: 98 % | WEIGHT: 146 LBS | DIASTOLIC BLOOD PRESSURE: 72 MMHG | SYSTOLIC BLOOD PRESSURE: 111 MMHG

## 2023-01-03 DIAGNOSIS — R07.89 OTHER CHEST PAIN: Primary | ICD-10-CM

## 2023-01-03 DIAGNOSIS — R94.31 ABNORMAL ELECTROCARDIOGRAM: ICD-10-CM

## 2023-01-03 DIAGNOSIS — R00.2 PALPITATIONS: ICD-10-CM

## 2023-01-03 DIAGNOSIS — R06.09 EXERTIONAL DYSPNEA: ICD-10-CM

## 2023-01-03 PROCEDURE — 99214 OFFICE O/P EST MOD 30 MIN: CPT | Performed by: INTERNAL MEDICINE

## 2023-01-03 PROCEDURE — 93000 ELECTROCARDIOGRAM COMPLETE: CPT | Performed by: INTERNAL MEDICINE

## 2023-01-03 NOTE — LETTER
1/3/2023    Efren Alvarado MD  [address] [facility] [address]    RE: Donald Laughlin       Dear Colleague,     I had the pleasure of seeing Donald Laughlin in the [facility].  [facility] Progress note    Assessment:  1. Exertional chest pain  2. Palpitations, not associated with arrhythmia  3. Incomplete right bundle branch block possible RVH.  Previous echo with normal RV.    4. Anxiety    Plan:  1. Exercise EKG stress test  2. Limited echo to evaluate RV size and function and estimated pulmonary artery pressure.  3. If the above tests are normal he does not need to follow-up in cardiology clinic.    HPI:     Donald Laughlin is a very nice 22-year-old gentleman here in cardiology clinic to follow-up on palpitations.  I have met him previously and he is also followed up with IV K back in [place] regarding the symptoms.    He is still having daily palpitations which is a uncomfortable and prolonged sensation of his heart pounding. They occur at random times, with or without exertion.  He also notes chest pain with exertion. He has heat intolerance and gets chest pain when he is hot.. He gets chest pain and palpitaions and senses palpitations in his throat.  He has sought treatment for it initiated medication for anxiety.    He tries to do treadmill exercisses at home.  He is really limited by his chest pain and palpitations.  He is afraid to exercise.  He minimizes caffeine. Does not smoke or drink alcohol. Gets good sleep. Stays hydrated.  Tries to follow recommendations for healthy diet and lifestyle.      I personally reviewed the following cardiovascular tests.  -EKG today sinus rhythm I RBBB prominent R wave V1 and rightward axis, similar to previous EKG from 9/17/2021  -2-week Zio patch monitor March 2022 showed sinus rhythm with rare PACs and PVCs.  Frequent triggered recordings do not correlate with arrhythmia.  -Echocardiogram 7/19/2021 EF 60%.  Normal RV size  and function.  No valve disease.  Estimated RVSP was 11 mmHg.  IVC was normal.  -1 week Zio patch monitor in July 2021 also showed sinus rhythm and triggered rhythm strips do not correlate with arrhythmia      CURRENT MEDICATIONS:  Current Outpatient Medications   Medication Sig Dispense Refill     citalopram (CELEXA) 20 MG tablet Take 1 tablet (20 mg) by mouth daily 90 tablet 2     ALLERGY RELIEF 180 MG tablet Take 180 mg by mouth daily (Patient not taking: Reported on 1/3/2023)       fexofenadine (ALLEGRA) 180 MG tablet Take by mouth every 24 hours (Patient not taking: Reported on 1/3/2023)         ALLERGIES     Allergies   Allergen Reactions     Chlorhexidine Rash     Dust Mite Extract Rash       PAST MEDICAL, SURGICAL, FAMILY, SOCIAL HISTORY:  History was reviewed and updated as needed, see medical record.    REVIEW OF SYSTEMS:  Skin:  Negative     Eyes:  Positive for glasses  ENT:  Negative    Respiratory:  Positive for dyspnea on exertion;dyspnea at rest  Cardiovascular:    palpitations;Positive for;chest pain;lightheadedness;fatigue  Gastroenterology: Negative heartburn;nausea  Genitourinary:  not assessed    Musculoskeletal:  Negative    Neurologic:  Negative    Psychiatric:  Positive for depression;anxiety  Heme/Lymph/Imm:  Positive for allergies  Endocrine:  Negative thyroid disorder;diabetes    PHYSICAL EXAM:      BP: 111/72 Pulse: 88     SpO2: 98 %      Vital Signs with Ranges  Pulse:  [88] 88  BP: (111)/(72) 111/72  SpO2:  [98 %] 98 %  146 lbs 0 oz    Constitutional: awake, alert, no distress  Eyes: sclera nonicteric  ENT: trachea midline  Respiratory: Clear to auscultation bilaterally  Cardiovascular: Regular rate and rhythm no murmur rub or gallop  GI: nondistended, nontender, bowel sounds present  Skin: dry, no rash no edema  Musculoskeletal: grossly normal muscle bulk and tone  Neurologic: no focal deficits  Neuropsychiatric: Normal affect    Recent Lab Results:  LIPID RESULTS:  Lab Results    Component Value Date    CHOL 122 08/16/2022    CHOL 125 07/06/2021    HDL 46 08/16/2022    HDL 40 (L) 07/06/2021    LDL 66 08/16/2022    LDL 72 07/06/2021    TRIG 52 08/16/2022    TRIG 63 07/06/2021       LIVER ENZYME RESULTS:  Lab Results   Component Value Date    AST 15 08/16/2022    AST 14 07/05/2021    ALT 15 08/16/2022    ALT 25 07/05/2021       CBC RESULTS:  Lab Results   Component Value Date    WBC 3.8 (L) 03/16/2022    WBC 8.0 07/05/2021    RBC 4.45 03/16/2022    RBC 5.00 07/05/2021    HGB 13.8 03/16/2022    HGB 15.6 07/05/2021    HCT 41.9 03/16/2022    HCT 45.8 07/05/2021    MCV 94 03/16/2022    MCV 92 07/05/2021    MCH 31.0 03/16/2022    MCH 31.2 07/05/2021    MCHC 32.9 03/16/2022    MCHC 34.1 07/05/2021    RDW 12.3 03/16/2022    RDW 12.2 07/05/2021     03/16/2022     07/05/2021       BMP RESULTS:  Lab Results   Component Value Date     03/16/2022     07/05/2021    POTASSIUM 3.7 03/16/2022    POTASSIUM 3.8 07/05/2021    CHLORIDE 103 03/16/2022    CHLORIDE 105 07/05/2021    CO2 30 03/16/2022    CO2 29 07/05/2021    ANIONGAP 5 03/16/2022    ANIONGAP 4 07/05/2021    GLC 90 03/16/2022    GLC 80 07/05/2021    BUN 11 03/16/2022    BUN 7 07/05/2021    CR 0.74 03/16/2022    CR 0.85 07/05/2021    GFRESTIMATED >90 03/16/2022    GFRESTIMATED >90 07/05/2021    GFRESTBLACK >90 07/05/2021    NAVEED 9.0 03/16/2022    NAVEED 9.3 07/05/2021        Thank you for allowing me to participate in the care of your patient.      Sincerely,     Korin Garcia MD     [facility]  cc:   No referring provider defined for this encounter.

## 2023-01-03 NOTE — PROGRESS NOTES
[facility] Progress note    Assessment:  1. Exertional chest pain  2. Palpitations, not associated with arrhythmia  3. Incomplete right bundle branch block possible RVH.  Previous echo with normal RV.    4. Anxiety    Plan:  1. Exercise EKG stress test  2. Limited echo to evaluate RV size and function and estimated pulmonary artery pressure.  3. If the above tests are normal he does not need to follow-up in cardiology clinic.    HPI:     Donald Laughlin is a very nice 22-year-old gentleman here in cardiology clinic to follow-up on palpitations.  I have met him previously and he is also followed up with IV K back in [place] regarding the symptoms.    He is still having daily palpitations which is a uncomfortable and prolonged sensation of his heart pounding. They occur at random times, with or without exertion.  He also notes chest pain with exertion. He has heat intolerance and gets chest pain when he is hot.. He gets chest pain and palpitaions and senses palpitations in his throat.  He has sought treatment for it initiated medication for anxiety.    He tries to do treadmill exercisses at home.  He is really limited by his chest pain and palpitations.  He is afraid to exercise.  He minimizes caffeine. Does not smoke or drink alcohol. Gets good sleep. Stays hydrated.  Tries to follow recommendations for healthy diet and lifestyle.      I personally reviewed the following cardiovascular tests.  -EKG today sinus rhythm I RBBB prominent R wave V1 and rightward axis, similar to previous EKG from 9/17/2021  -2-week Zio patch monitor March 2022 showed sinus rhythm with rare PACs and PVCs.  Frequent triggered recordings do not correlate with arrhythmia.  -Echocardiogram 7/19/2021 EF 60%.  Normal RV size and function.  No valve disease.  Estimated RVSP was 11 mmHg.  IVC was normal.  -1 week Zio patch monitor in July 2021 also showed sinus rhythm and triggered rhythm strips do not correlate with  arrhythmia      CURRENT MEDICATIONS:  Current Outpatient Medications   Medication Sig Dispense Refill     citalopram (CELEXA) 20 MG tablet Take 1 tablet (20 mg) by mouth daily 90 tablet 2     ALLERGY RELIEF 180 MG tablet Take 180 mg by mouth daily (Patient not taking: Reported on 1/3/2023)       fexofenadine (ALLEGRA) 180 MG tablet Take by mouth every 24 hours (Patient not taking: Reported on 1/3/2023)         ALLERGIES     Allergies   Allergen Reactions     Chlorhexidine Rash     Dust Mite Extract Rash       PAST MEDICAL, SURGICAL, FAMILY, SOCIAL HISTORY:  History was reviewed and updated as needed, see medical record.    REVIEW OF SYSTEMS:  Skin:  Negative     Eyes:  Positive for glasses  ENT:  Negative    Respiratory:  Positive for dyspnea on exertion;dyspnea at rest  Cardiovascular:    palpitations;Positive for;chest pain;lightheadedness;fatigue  Gastroenterology: Negative heartburn;nausea  Genitourinary:  not assessed    Musculoskeletal:  Negative    Neurologic:  Negative    Psychiatric:  Positive for depression;anxiety  Heme/Lymph/Imm:  Positive for allergies  Endocrine:  Negative thyroid disorder;diabetes    PHYSICAL EXAM:      BP: 111/72 Pulse: 88     SpO2: 98 %      Vital Signs with Ranges  Pulse:  [88] 88  BP: (111)/(72) 111/72  SpO2:  [98 %] 98 %  146 lbs 0 oz    Constitutional: awake, alert, no distress  Eyes: sclera nonicteric  ENT: trachea midline  Respiratory: Clear to auscultation bilaterally  Cardiovascular: Regular rate and rhythm no murmur rub or gallop  GI: nondistended, nontender, bowel sounds present  Skin: dry, no rash no edema  Musculoskeletal: grossly normal muscle bulk and tone  Neurologic: no focal deficits  Neuropsychiatric: Normal affect    Recent Lab Results:  LIPID RESULTS:  Lab Results   Component Value Date    CHOL 122 08/16/2022    CHOL 125 07/06/2021    HDL 46 08/16/2022    HDL 40 (L) 07/06/2021    LDL 66 08/16/2022    LDL 72 07/06/2021    TRIG 52 08/16/2022    TRIG 63 07/06/2021        LIVER ENZYME RESULTS:  Lab Results   Component Value Date    AST 15 08/16/2022    AST 14 07/05/2021    ALT 15 08/16/2022    ALT 25 07/05/2021       CBC RESULTS:  Lab Results   Component Value Date    WBC 3.8 (L) 03/16/2022    WBC 8.0 07/05/2021    RBC 4.45 03/16/2022    RBC 5.00 07/05/2021    HGB 13.8 03/16/2022    HGB 15.6 07/05/2021    HCT 41.9 03/16/2022    HCT 45.8 07/05/2021    MCV 94 03/16/2022    MCV 92 07/05/2021    MCH 31.0 03/16/2022    MCH 31.2 07/05/2021    MCHC 32.9 03/16/2022    MCHC 34.1 07/05/2021    RDW 12.3 03/16/2022    RDW 12.2 07/05/2021     03/16/2022     07/05/2021       BMP RESULTS:  Lab Results   Component Value Date     03/16/2022     07/05/2021    POTASSIUM 3.7 03/16/2022    POTASSIUM 3.8 07/05/2021    CHLORIDE 103 03/16/2022    CHLORIDE 105 07/05/2021    CO2 30 03/16/2022    CO2 29 07/05/2021    ANIONGAP 5 03/16/2022    ANIONGAP 4 07/05/2021    GLC 90 03/16/2022    GLC 80 07/05/2021    BUN 11 03/16/2022    BUN 7 07/05/2021    CR 0.74 03/16/2022    CR 0.85 07/05/2021    GFRESTIMATED >90 03/16/2022    GFRESTIMATED >90 07/05/2021    GFRESTBLACK >90 07/05/2021    NAVEED 9.0 03/16/2022    NAVEED 9.3 07/05/2021

## 2023-01-05 ENCOUNTER — HOSPITAL ENCOUNTER (OUTPATIENT)
Dept: CARDIOLOGY | Facility: CLINIC | Age: 23
Discharge: HOME | End: 2023-01-05
Attending: INTERNAL MEDICINE | Admitting: INTERNAL MEDICINE
Payer: COMMERCIAL

## 2023-01-05 DIAGNOSIS — R00.2 PALPITATIONS: ICD-10-CM

## 2023-01-05 DIAGNOSIS — R06.09 EXERTIONAL DYSPNEA: ICD-10-CM

## 2023-01-05 DIAGNOSIS — R94.31 ABNORMAL ELECTROCARDIOGRAM: ICD-10-CM

## 2023-01-05 LAB — LVEF ECHO: NORMAL

## 2023-01-05 PROCEDURE — 93308 TTE F-UP OR LMTD: CPT | Mod: 26 | Performed by: INTERNAL MEDICINE

## 2023-01-05 PROCEDURE — 93321 DOPPLER ECHO F-UP/LMTD STD: CPT

## 2023-01-05 PROCEDURE — 93325 DOPPLER ECHO COLOR FLOW MAPG: CPT | Mod: 26 | Performed by: INTERNAL MEDICINE

## 2023-01-05 PROCEDURE — 93321 DOPPLER ECHO F-UP/LMTD STD: CPT | Mod: 26 | Performed by: INTERNAL MEDICINE

## 2023-01-18 ENCOUNTER — HOSPITAL ENCOUNTER (OUTPATIENT)
Dept: CARDIOLOGY | Facility: CLINIC | Age: 23
Discharge: HOME | End: 2023-01-18
Attending: INTERNAL MEDICINE | Admitting: INTERNAL MEDICINE
Payer: COMMERCIAL

## 2023-01-18 DIAGNOSIS — R00.2 PALPITATIONS: ICD-10-CM

## 2023-01-18 DIAGNOSIS — R94.31 ABNORMAL ELECTROCARDIOGRAM: ICD-10-CM

## 2023-01-18 DIAGNOSIS — R06.09 EXERTIONAL DYSPNEA: ICD-10-CM

## 2023-01-18 PROCEDURE — 93018 CV STRESS TEST I&R ONLY: CPT | Performed by: INTERNAL MEDICINE

## 2023-01-18 PROCEDURE — 93017 CV STRESS TEST TRACING ONLY: CPT

## 2023-01-18 PROCEDURE — 93016 CV STRESS TEST SUPVJ ONLY: CPT | Performed by: INTERNAL MEDICINE

## 2023-01-21 ENCOUNTER — OFFICE VISIT (OUTPATIENT)
Dept: URGENT CARE | Facility: URGENT CARE | Age: 23
End: 2023-01-21
Payer: COMMERCIAL

## 2023-01-21 VITALS
SYSTOLIC BLOOD PRESSURE: 100 MMHG | TEMPERATURE: 100.1 F | WEIGHT: 146 LBS | BODY MASS INDEX: 24.3 KG/M2 | RESPIRATION RATE: 20 BRPM | DIASTOLIC BLOOD PRESSURE: 58 MMHG | HEART RATE: 103 BPM | OXYGEN SATURATION: 100 %

## 2023-01-21 DIAGNOSIS — J06.9 VIRAL URI: Primary | ICD-10-CM

## 2023-01-21 DIAGNOSIS — R50.9 FEVER IN ADULT: ICD-10-CM

## 2023-01-21 DIAGNOSIS — R07.0 THROAT PAIN: ICD-10-CM

## 2023-01-21 DIAGNOSIS — R11.2 NAUSEA AND VOMITING, UNSPECIFIED VOMITING TYPE: ICD-10-CM

## 2023-01-21 PROBLEM — R10.31 RIGHT LOWER QUADRANT PAIN: Status: ACTIVE | Noted: 2023-01-21

## 2023-01-21 PROBLEM — R10.13 EPIGASTRIC PAIN: Status: ACTIVE | Noted: 2021-10-04

## 2023-01-21 PROBLEM — K30 INDIGESTION: Status: ACTIVE | Noted: 2023-01-21

## 2023-01-21 PROBLEM — R19.5 LOOSE STOOLS: Status: ACTIVE | Noted: 2023-01-21

## 2023-01-21 PROBLEM — R19.5 ABNORMAL FECES: Status: ACTIVE | Noted: 2021-09-30

## 2023-01-21 LAB
DEPRECATED S PYO AG THROAT QL EIA: NEGATIVE
FLUAV AG SPEC QL IA: NEGATIVE
FLUBV AG SPEC QL IA: NEGATIVE
GROUP A STREP BY PCR: NOT DETECTED
SARS-COV-2 RNA RESP QL NAA+PROBE: POSITIVE

## 2023-01-21 PROCEDURE — 87804 INFLUENZA ASSAY W/OPTIC: CPT | Performed by: NURSE PRACTITIONER

## 2023-01-21 PROCEDURE — U0003 INFECTIOUS AGENT DETECTION BY NUCLEIC ACID (DNA OR RNA); SEVERE ACUTE RESPIRATORY SYNDROME CORONAVIRUS 2 (SARS-COV-2) (CORONAVIRUS DISEASE [COVID-19]), AMPLIFIED PROBE TECHNIQUE, MAKING USE OF HIGH THROUGHPUT TECHNOLOGIES AS DESCRIBED BY CMS-2020-01-R: HCPCS | Performed by: NURSE PRACTITIONER

## 2023-01-21 PROCEDURE — 87651 STREP A DNA AMP PROBE: CPT | Performed by: NURSE PRACTITIONER

## 2023-01-21 PROCEDURE — 99214 OFFICE O/P EST MOD 30 MIN: CPT | Mod: CS | Performed by: NURSE PRACTITIONER

## 2023-01-21 PROCEDURE — U0005 INFEC AGEN DETEC AMPLI PROBE: HCPCS | Performed by: NURSE PRACTITIONER

## 2023-01-21 RX ORDER — ONDANSETRON HYDROCHLORIDE 4 MG/5ML
4 SOLUTION ORAL ONCE
Status: COMPLETED | OUTPATIENT
Start: 2023-01-21 | End: 2023-01-21

## 2023-01-21 RX ORDER — ONDANSETRON 4 MG/1
4 TABLET, ORALLY DISINTEGRATING ORAL EVERY 8 HOURS PRN
Qty: 21 TABLET | Refills: 0 | Status: SHIPPED | OUTPATIENT
Start: 2023-01-21 | End: 2023-01-28

## 2023-01-21 RX ADMIN — ONDANSETRON HYDROCHLORIDE 4 MG: 4 SOLUTION ORAL at 09:51

## 2023-01-21 NOTE — PATIENT INSTRUCTIONS
Discharge Instructions  Upper Respiratory Infection    The upper respiratory tract includes the sinuses, nasal passages, pharynx, and larynx. A URI, or upper respiratory infection, is an infection of any of the parts of the upper airway. Symptoms include runny nose, congestion, sore throat, cough, and fever. URIs are almost always caused by a virus. Antibiotics do not help with virus infections, so are not used for an ordinary URI. A URI is very contagious through coughing and nasal secretions; make sure you wash your hands often and clean surfaces after sneezing, coughing or touching them.  Viruses can live on surfaces for up to 3 days.      Return to the Emergency Department if:  Any of the symptoms you have get much worse.  You seem very sick, like being too weak to get up.  You have any new symptoms, especially serious things like chest pain.   You are short of breath.   You have a severe headache.  You are vomiting so much you can t keep fluids or medicines down.  You have confusion or seem unusually drowsy.  You have a seizure or convulsion.    Follow-up:    You should start to improve in 3 - 5 days.  A cough can linger for up to six weeks, but overall you should be feeling much better.  See your doctor if you have a fever for more than 3 days, or if you are not feeling better within 5 days.      What can I do to help myself?  Fill any prescriptions the doctor gave you and take them right away  If you have a fever, get plenty of rest and drink lots of fluids, especially water. Using a humidifier or saline nose spray will also help loosen secretions.   What clothes or blankets you have on won t change your fever. Do what is comfortable for you.  Bathing or sponging in lukewarm water may help you feel better.  Tylenol  (acetaminophen), Motrin  (ibuprofen), or Advil  (ibuprofen) help bring fever down and may help you feel more comfortable. Be sure to read and follow the package directions, and ask your doctor if  you have questions.  Do not drink alcohol.  Decongestants may help you feel better. You may use decongestant nose sprays Afrin  (oxymetazoline) or Chandrakant-Synephrine  (phenylephrine hydrochloride) for up to 3 days, or may use a decongestant tablet like Sudafed  (pseudoephedrine).  If you were given a prescription for medicine here today, be sure to read all of the information (including the package insert) that comes with your prescription.  This will include important information about the medicine, its side effects, and any warnings that you need to know about.  The pharmacist who fills the prescription can provide more information and answer questions you may have about the medicine.  If you have questions or concerns that the pharmacist cannot address, please call or return to the Emergency Department.   Opioid Medication Information    Pain medications are among the most commonly prescribed medicines, so we are including this information for all our patients. If you did not receive pain medication or get a prescription for pain medicine, you can ignore it.     You may have been given a prescription for an opioid (narcotic) pain medicine and/or have received a pain medicine while here in the Emergency Department. These medicines can make you drowsy or impaired. You must not drive, operate dangerous equipment, or engage in any other dangerous activities while taking these medications. If you drive while taking these medications, you could be arrested for DUI, or driving under the influence. Do not drink any alcohol while you are taking these medications.     Opioid pain medications can cause addiction. If you have a history of chemical dependency of any type, you are at a higher risk of becoming addicted to pain medications.  Only take these prescribed medications to treat your pain when all other options have been tried. Take it for as short a time and as few doses as possible. Store your pain pills in a secure  place, as they are frequently stolen and provide a dangerous opportunity for children or visitors in your house to start abusing these powerful medications. We will not replace any lost or stolen medicine.  As soon as your pain is better, you should flush all your remaining medication.     Many prescription pain medications contain Tylenol  (acetaminophen), including Vicodin , Tylenol #3 , Norco , Lortab , and Percocet .  You should not take any extra pills of Tylenol  if you are using these prescription medications or you can get very sick.  Do not ever take more than 3000 mg of acetaminophen in any 24 hour period.    All opioids tend to cause constipation. Drink plenty of water and eat foods that have a lot of fiber, such as fruits, vegetables, prune juice, apple juice and high fiber cereal.  Take a laxative if you don t move your bowels at least every other day. Miralax , Milk of Magnesia, Colace , or Senna  can be used to keep you regular.      Remember that you can always come back to the Emergency Department if you are not able to see your regular doctor in the amount of time listed above, if you get any new symptoms, or if there is anything that worries you.

## 2023-01-21 NOTE — PROGRESS NOTES
Chief Complaint   Patient presents with     Vomiting     Fever     Pharyngitis     Sx's for 2 days         ICD-10-CM    1. Viral URI  J06.9       2. Throat pain  R07.0 Streptococcus A Rapid Screen w/Reflex to PCR - Clinic Collect     Symptomatic COVID-19 Virus (Coronavirus) by PCR Nose     Group A Streptococcus PCR Throat Swab      3. Fever  R50.9 Symptomatic COVID-19 Virus (Coronavirus) by PCR Nose     Influenza A & B Antigen - Clinic Collect      4. Nausea and vomiting, unspecified vomiting type  R11.2 ondansetron (ZOFRAN ODT) 4 MG ODT tab     ondansetron (ZOFRAN) solution 4 mg      Ondansetron for nausea, reviewed proper diet for nausea and vomiting.  Tylenol as needed for fever or aches.  Rest.  Fluids.  Await results of COVID-19 testing.      Results for orders placed or performed in visit on 01/21/23 (from the past 24 hour(s))   Streptococcus A Rapid Screen w/Reflex to PCR - Clinic Collect    Specimen: Throat; Swab   Result Value Ref Range    Group A Strep antigen Negative Negative   Influenza A & B Antigen - Clinic Collect    Specimen: Nasopharyngeal; Swab   Result Value Ref Range    Influenza A antigen Negative Negative    Influenza B antigen Negative Negative    Narrative    Test results must be correlated with clinical data. If necessary, results should be confirmed by a molecular assay or viral culture.       Subjective     Donald Laughlin is an 22 year old male who presents to clinic today for sore throat and fever for 2 days.      ROS: 10 point ROS neg other than the symptoms noted above in the HPI.       Objective    /58   Pulse 103   Temp 100.1  F (37.8  C)   Resp 20   Wt 66.2 kg (146 lb)   SpO2 100%   BMI 24.30 kg/m    Nurses notes and VS have been reviewed.    Physical Exam       GENERAL APPEARANCE: alert and mild distress     EYES: PERRL, EOMI, sclera non-icteric     HENT: ear canals and TM's normal, nose and mouth without ulcers or lesions and pharyngeal erythema     NECK: no  adenopathy or asymmetry, thyroid normal to palpation     RESP: lungs clear to auscultation - no rales, rhonchi or wheezes     CV: regular rates and rhythm, no murmurs, rubs, or gallop     SKIN: no suspicious lesions or rashes    Patient Instructions   Discharge Instructions  Upper Respiratory Infection    The upper respiratory tract includes the sinuses, nasal passages, pharynx, and larynx. A URI, or upper respiratory infection, is an infection of any of the parts of the upper airway. Symptoms include runny nose, congestion, sore throat, cough, and fever. URIs are almost always caused by a virus. Antibiotics do not help with virus infections, so are not used for an ordinary URI. A URI is very contagious through coughing and nasal secretions; make sure you wash your hands often and clean surfaces after sneezing, coughing or touching them.  Viruses can live on surfaces for up to 3 days.      Return to the Emergency Department if:    Any of the symptoms you have get much worse.    You seem very sick, like being too weak to get up.    You have any new symptoms, especially serious things like chest pain.     You are short of breath.     You have a severe headache.    You are vomiting so much you can t keep fluids or medicines down.    You have confusion or seem unusually drowsy.    You have a seizure or convulsion.    Follow-up:      You should start to improve in 3 - 5 days.  A cough can linger for up to six weeks, but overall you should be feeling much better.  See your doctor if you have a fever for more than 3 days, or if you are not feeling better within 5 days.      What can I do to help myself?    Fill any prescriptions the doctor gave you and take them right away    If you have a fever, get plenty of rest and drink lots of fluids, especially water. Using a humidifier or saline nose spray will also help loosen secretions.     What clothes or blankets you have on won t change your fever. Do what is comfortable for  you.    Bathing or sponging in lukewarm water may help you feel better.    Tylenol  (acetaminophen), Motrin  (ibuprofen), or Advil  (ibuprofen) help bring fever down and may help you feel more comfortable. Be sure to read and follow the package directions, and ask your doctor if you have questions.    Do not drink alcohol.    Decongestants may help you feel better. You may use decongestant nose sprays Afrin  (oxymetazoline) or Chandrakant-Synephrine  (phenylephrine hydrochloride) for up to 3 days, or may use a decongestant tablet like Sudafed  (pseudoephedrine).  If you were given a prescription for medicine here today, be sure to read all of the information (including the package insert) that comes with your prescription.  This will include important information about the medicine, its side effects, and any warnings that you need to know about.  The pharmacist who fills the prescription can provide more information and answer questions you may have about the medicine.  If you have questions or concerns that the pharmacist cannot address, please call or return to the Emergency Department.   Opioid Medication Information    Pain medications are among the most commonly prescribed medicines, so we are including this information for all our patients. If you did not receive pain medication or get a prescription for pain medicine, you can ignore it.     You may have been given a prescription for an opioid (narcotic) pain medicine and/or have received a pain medicine while here in the Emergency Department. These medicines can make you drowsy or impaired. You must not drive, operate dangerous equipment, or engage in any other dangerous activities while taking these medications. If you drive while taking these medications, you could be arrested for DUI, or driving under the influence. Do not drink any alcohol while you are taking these medications.     Opioid pain medications can cause addiction. If you have a history of chemical  dependency of any type, you are at a higher risk of becoming addicted to pain medications.  Only take these prescribed medications to treat your pain when all other options have been tried. Take it for as short a time and as few doses as possible. Store your pain pills in a secure place, as they are frequently stolen and provide a dangerous opportunity for children or visitors in your house to start abusing these powerful medications. We will not replace any lost or stolen medicine.  As soon as your pain is better, you should flush all your remaining medication.     Many prescription pain medications contain Tylenol  (acetaminophen), including Vicodin , Tylenol #3 , Norco , Lortab , and Percocet .  You should not take any extra pills of Tylenol  if you are using these prescription medications or you can get very sick.  Do not ever take more than 3000 mg of acetaminophen in any 24 hour period.    All opioids tend to cause constipation. Drink plenty of water and eat foods that have a lot of fiber, such as fruits, vegetables, prune juice, apple juice and high fiber cereal.  Take a laxative if you don t move your bowels at least every other day. Miralax , Milk of Magnesia, Colace , or Senna  can be used to keep you regular.      Remember that you can always come back to the Emergency Department if you are not able to see your regular doctor in the amount of time listed above, if you get any new symptoms, or if there is anything that worries you.         CALLIE Blackmon, [facility]gent [facility] Provider    The use of Dragon/[employer] dictation services may have been used to construct the content in this note; any grammatical or spelling errors are non-intentional. Please contact the author of this note directly if you are in need of any clarification.

## 2023-01-22 ENCOUNTER — TELEPHONE (OUTPATIENT)
Dept: NURSING | Facility: CLINIC | Age: 23
End: 2023-01-22
Payer: COMMERCIAL

## 2023-01-22 NOTE — TELEPHONE ENCOUNTER
Coronavirus (COVID-19) Notification    Caller Name (Patient, parent, daughter/son, grandparent, etc)  Patient     Reason for call  Notify of Positive Coronavirus (COVID-19) lab results, assess symptoms,  review [facility] recommendations    Lab Result    Lab test:  2019-nCoV rRt-PCR or SARS-CoV-2 PCR    Oropharyngeal AND/OR nasopharyngeal swabs is POSITIVE for 2019-nCoV RNA/SARS-COV-2 PCR (COVID-19 virus)      Gather patient reported symptoms   Assessment   Current Symptoms at time of phone call, reported by patient: (if no symptoms, document: No symptoms] Yes   Date of symptom(s) onset (if applicable) 01/19/2022     If at time of call, Patients symptoms have worsened, the Patient should contact 911 or have someone drive them to Emergency Dept promptly:      If Patient calling 911, inform 911 personal that you have tested positive for the Coronavirus (COVID-19).  Place mask on and await 911 to arrive.    If Emergency Dept, If possible, please have another adult drive you to the Emergency Dept but you need to wear mask when in contact with other people.      Treatment Options:   Patient classified as COVID treatment eligible by Epic high risk algorithm: No  You may be eligible to receive a new treatment with a monoclonal antibody for preventing hospitalization in patients at high risk for complications from COVID-19.  This medication is still experimental and available on a limited basis; it is given through an IV and must be given at an infusion center.  Please note that not all people who are eligible will receive the medication since it is in limited supply.   Is the patient symptomatic and onset of symptoms within the last 7 days? No. Patient does not qualify.    Review information with Patient    Your result was positive. This means you have COVID-19 (coronavirus).    How can I protect others?    These guidelines are for isolating before returning to work, school or .    If you DO have  symptoms    Stay home and away from others     For at least 5 days after your symptoms started, AND    You are fever free for 24 hours (with no medicine that reduces fever), AND    Your other symptoms are better    Wear a mask for 10 full days anytime you are around others    If you DON'T have symptoms    Stay home and away from others for at least 5 days after your positive test    Wear a mask for 10 full days anytime you are around others    There may be different guidelines for healthcare facilities.  Please check with the specific sites before arriving.    If you have been told by a doctor that you were severely ill with COVID-19 or are immunocompromised, you should isolate for at least 10 days.    You should not go back to work until you meet the guidelines above for ending your home isolation. You don't need to be retested for COVID-19 before going back to work--studies show that you won't spread the virus if it's been at least 10 days since your symptoms started (or 20 days, if you have a weak immune system).    Employers, schools, and daycares: This is an official notice for this person's medical guidelines for returning in-person.  They must meet the above guidelines before going back to work, school or  in person.    You will receive a positive COVID-19 letter via [employer] or the mail soon with additional self-care information.    Would you like me to review some of that information with you now?  No    If you were tested for an upcoming procedure, please contact your provider for next steps.    Raynn Esparza

## 2023-01-22 NOTE — TELEPHONE ENCOUNTER
Patient classified as COVID treatment eligible by Epic high risk algorithm:  Yes    Coronavirus (COVID-19) Notification    Reason for call  Notify of POSITIVE COVID-19 lab result, assess symptoms,  review [facility] recommendations    Lab Result   Lab test for 2019-nCoV rRt-PCR or SARS-COV-2 PCR  Oropharyngeal AND/OR nasopharyngeal swabs were POSITIVE for 2019-nCoV RNA [OR] SARS-COV-2 RNA (COVID-19) RNA     We have been unable to reach patient by phone at this time to notify of their Positive COVID-19 result.    Left voicemail message requesting a call back to [phone] [facility] for results.        A Positive COVID-19 letter will be sent via [employer] or the mail.    Christal Martinez

## 2023-01-30 ENCOUNTER — ANCILLARY PROCEDURE (OUTPATIENT)
Dept: GENERAL RADIOLOGY | Facility: CLINIC | Age: 23
End: 2023-01-30
Attending: FAMILY MEDICINE
Payer: COMMERCIAL

## 2023-01-30 ENCOUNTER — OFFICE VISIT (OUTPATIENT)
Dept: URGENT CARE | Facility: URGENT CARE | Age: 23
End: 2023-01-30
Payer: COMMERCIAL

## 2023-01-30 VITALS
DIASTOLIC BLOOD PRESSURE: 60 MMHG | BODY MASS INDEX: 24.3 KG/M2 | SYSTOLIC BLOOD PRESSURE: 115 MMHG | TEMPERATURE: 99.3 F | RESPIRATION RATE: 18 BRPM | OXYGEN SATURATION: 100 % | HEART RATE: 82 BPM | WEIGHT: 146 LBS

## 2023-01-30 DIAGNOSIS — R07.9 CHEST PAIN, UNSPECIFIED TYPE: Primary | ICD-10-CM

## 2023-01-30 DIAGNOSIS — R06.02 SOB (SHORTNESS OF BREATH): ICD-10-CM

## 2023-01-30 DIAGNOSIS — U07.1 INFECTION DUE TO 2019 NOVEL CORONAVIRUS: ICD-10-CM

## 2023-01-30 LAB
BASOPHILS # BLD AUTO: 0 10E3/UL (ref 0–0.2)
BASOPHILS NFR BLD AUTO: 0 %
D DIMER PPP FEU-MCNC: <0.27 UG/ML FEU (ref 0–0.5)
EOSINOPHIL # BLD AUTO: 0.1 10E3/UL (ref 0–0.7)
EOSINOPHIL NFR BLD AUTO: 2 %
ERYTHROCYTE [DISTWIDTH] IN BLOOD BY AUTOMATED COUNT: 12.4 % (ref 10–15)
HCT VFR BLD AUTO: 41.9 % (ref 40–53)
HGB BLD-MCNC: 14.1 G/DL (ref 13.3–17.7)
LYMPHOCYTES # BLD AUTO: 1.9 10E3/UL (ref 0.8–5.3)
LYMPHOCYTES NFR BLD AUTO: 34 %
MCH RBC QN AUTO: 29.7 PG (ref 26.5–33)
MCHC RBC AUTO-ENTMCNC: 33.7 G/DL (ref 31.5–36.5)
MCV RBC AUTO: 88 FL (ref 78–100)
MONOCYTES # BLD AUTO: 0.6 10E3/UL (ref 0–1.3)
MONOCYTES NFR BLD AUTO: 11 %
NEUTROPHILS # BLD AUTO: 2.9 10E3/UL (ref 1.6–8.3)
NEUTROPHILS NFR BLD AUTO: 53 %
PLATELET # BLD AUTO: 307 10E3/UL (ref 150–450)
RBC # BLD AUTO: 4.74 10E6/UL (ref 4.4–5.9)
WBC # BLD AUTO: 5.4 10E3/UL (ref 4–11)

## 2023-01-30 PROCEDURE — 85379 FIBRIN DEGRADATION QUANT: CPT | Performed by: FAMILY MEDICINE

## 2023-01-30 PROCEDURE — 36415 COLL VENOUS BLD VENIPUNCTURE: CPT | Performed by: FAMILY MEDICINE

## 2023-01-30 PROCEDURE — 99214 OFFICE O/P EST MOD 30 MIN: CPT | Mod: CS | Performed by: FAMILY MEDICINE

## 2023-01-30 PROCEDURE — 85025 COMPLETE CBC W/AUTO DIFF WBC: CPT | Performed by: FAMILY MEDICINE

## 2023-01-30 PROCEDURE — 71046 X-RAY EXAM CHEST 2 VIEWS: CPT | Mod: TC | Performed by: RADIOLOGY

## 2023-01-30 RX ORDER — ALBUTEROL SULFATE 90 UG/1
2 AEROSOL, METERED RESPIRATORY (INHALATION) EVERY 6 HOURS
Qty: 18 G | Refills: 0 | Status: SHIPPED | OUTPATIENT
Start: 2023-01-30 | End: 2023-06-21

## 2023-01-30 RX ORDER — NAPROXEN 500 MG/1
500 TABLET ORAL 2 TIMES DAILY WITH MEALS
Qty: 14 TABLET | Refills: 0 | Status: SHIPPED | OUTPATIENT
Start: 2023-01-30 | End: 2023-02-06

## 2023-01-31 NOTE — PROGRESS NOTES
SUBJECTIVE: Donald Laughlin is a 22 year old male presenting with a chief complaint of recently positive for COVID but now with SOB/CP with respiration.  Onset of symptoms was day(s) ago.  Predisposing factors include None.    No past medical history on file.  Allergies   Allergen Reactions     Chlorhexidine Rash     Dust Mite Extract Rash     Social History     Tobacco Use     Smoking status: Never     Smokeless tobacco: Never   Substance Use Topics     Alcohol use: Never       ROS:  SKIN: no rash  GI: no vomiting    OBJECTIVE:  /60   Pulse 82   Temp 99.3  F (37.4  C) (Tympanic)   Resp 18   Wt 66.2 kg (146 lb)   SpO2 100%   BMI 24.30 kg/m  GENERAL APPEARANCE: healthy, alert and no distress  EYES: EOMI,  PERRL, conjunctiva clear  HENT: ear canals and TM's normal.  Nose and mouth without ulcers, erythema or lesions  RESP: lungs clear to auscultation - no rales, rhonchi or wheezes  CV: regular rates and rhythm, normal S1 S2, no murmur noted  SKIN: no suspicious lesions or rashes    Xray without acute findings, no pneumonia read by Efren James D.O.      ICD-10-CM    1. Chest pain, unspecified type  R07.9 CBC with Platelets & Differential     D dimer quantitative     XR Chest 2 Views     naproxen (NAPROSYN) 500 MG tablet      2. SOB (shortness of breath)  R06.02 albuterol (PROAIR HFA) 108 (90 Base) MCG/ACT inhaler      3. Infection due to 2019 novel coronavirus  U07.1           Fluids/Rest, f/u if worse/not any better

## 2023-02-15 ENCOUNTER — OFFICE VISIT (OUTPATIENT)
Dept: INTERNAL MEDICINE | Facility: CLINIC | Age: 23
End: 2023-02-15
Payer: COMMERCIAL

## 2023-02-15 VITALS
DIASTOLIC BLOOD PRESSURE: 70 MMHG | HEIGHT: 65 IN | SYSTOLIC BLOOD PRESSURE: 106 MMHG | HEART RATE: 78 BPM | WEIGHT: 148.8 LBS | BODY MASS INDEX: 24.79 KG/M2 | TEMPERATURE: 97.6 F | OXYGEN SATURATION: 97 % | RESPIRATION RATE: 14 BRPM

## 2023-02-15 DIAGNOSIS — R21 RASH: Primary | ICD-10-CM

## 2023-02-15 PROCEDURE — 99214 OFFICE O/P EST MOD 30 MIN: CPT | Performed by: INTERNAL MEDICINE

## 2023-02-15 RX ORDER — CEPHALEXIN 500 MG/1
500 CAPSULE ORAL 3 TIMES DAILY
Qty: 21 CAPSULE | Refills: 0 | Status: SHIPPED | OUTPATIENT
Start: 2023-02-15 | End: 2023-06-21

## 2023-02-15 NOTE — PROGRESS NOTES
Assessment & Plan     Rash  Atypical presentation.  Question post impetigo treatment.  Suggest trial of Keflex 500 mg 3 times daily for 7 days and if no improvement follow-up in dermatology clinic.  - Adult Dermatology Referral; Future  - cephALEXin (KEFLEX) 500 MG capsule; Take 1 capsule (500 mg) by mouth 3 times daily    Patient was advised that if he is interested in updating his vaccinations we could do so but he has declined.     Return in about 1 month (around 3/15/2023) for f/u Dermatology.    Efren Alvarado MD  [facility]    Clemencia Bennett is a 22 year old accompanied by his self, presenting for the following health issues:    Rash      History of Present Illness       Reason for visit:  Rash and covid concerns  Symptom onset:  3-4 weeks ago  Symptoms include:  Rash on going and follow up for covid concerns and SB on and off  Symptom intensity:  Mild  Symptom progression:  Staying the same  Had these symptoms before:  No  What makes it worse:  Excersise  What makes it better:  No    He eats 0-1 servings of fruits and vegetables daily.He consumes 2 sweetened beverage(s) daily.He exercises with enough effort to increase his heart rate 20 to 29 minutes per day.  He exercises with enough effort to increase his heart rate 4 days per week. He is missing 1 dose(s) of medications per week.       Patient is here today for follow-up of an apparent skin rash.  He has been seen by multiple subspecialists recently including cardiology for atypical palpitations and chest symptoms.  He underwent routine cardiological assessment inclusive of echocardiogram and stress test results which had been normal.    He is only seen me recently back in June 2022.  He apparently was seen by the podiatrist in June 2022 for onychomycosis and given a referral for dermatology.    He was then seen by the [facility] clinic in November 2022 for rash that he had noticed on his cheeks.  He was given  "Bactrim twice daily.    He informs me that he had COVID recently and is still kind of improving from such.  He still reports some intermittent respiratory symptoms with activity.    Review of Systems   CONSTITUTIONAL: NEGATIVE for fever, chills, change in weight  EYES: NEGATIVE for vision changes or irritation  ENT/MOUTH: NEGATIVE for ear, mouth and throat problems  CV: NEGATIVE for palpitations or peripheral edema  GI: NEGATIVE for nausea, abdominal pain, heartburn, or change in bowel habits  : NEGATIVE for frequency, dysuria, or hematuria  MUSCULOSKELETAL: NEGATIVE for significant arthralgias or myalgia  NEURO: NEGATIVE for weakness, dizziness or paresthesias  HEME: NEGATIVE for bleeding problems  PSYCHIATRIC: NEGATIVE for changes in mood or affect      Objective    /70   Pulse 78   Temp 97.6  F (36.4  C) (Temporal)   Resp 14   Ht 1.651 m (5' 5\")   Wt 67.5 kg (148 lb 12.8 oz)   SpO2 97%   BMI 24.76 kg/m    Body mass index is 24.76 kg/m .     Physical Exam   GENERAL: healthy, alert and no distress  EYES: Eyes grossly normal to inspection, PERRL and conjunctivae and sclerae normal  HENT: ear canals and TM's normal, nose and mouth without ulcers or lesions  NECK: no adenopathy, no asymmetry, masses, or scars and thyroid normal to palpation  RESP: lungs clear to auscultation - no rales, rhonchi or wheezes  CV: regular rate and rhythm, normal S1 S2, no S3 or S4, no murmur, click or rub  SKIN: There is a quarter sized area of clusters of inflammatory slightly scaly skin lesions noted to the right greater than left nasolabial fold.  NEURO: No focal changes  PSYCH: mentation appears normal, affect normal/bright                  "

## 2023-03-06 ENCOUNTER — OFFICE VISIT (OUTPATIENT)
Dept: FAMILY MEDICINE | Facility: CLINIC | Age: 23
End: 2023-03-06
Payer: COMMERCIAL

## 2023-03-06 DIAGNOSIS — L21.9 SEBORRHEIC DERMATITIS: ICD-10-CM

## 2023-03-06 DIAGNOSIS — R21 RASH: ICD-10-CM

## 2023-03-06 DIAGNOSIS — L30.9 DERMATITIS: Primary | ICD-10-CM

## 2023-03-06 DIAGNOSIS — D22.9 MULTIPLE BENIGN NEVI: ICD-10-CM

## 2023-03-06 PROCEDURE — 99203 OFFICE O/P NEW LOW 30 MIN: CPT | Performed by: PHYSICIAN ASSISTANT

## 2023-03-06 RX ORDER — KETOCONAZOLE 20 MG/G
CREAM TOPICAL DAILY
Qty: 30 G | Refills: 1 | Status: SHIPPED | OUTPATIENT
Start: 2023-03-06

## 2023-03-06 NOTE — LETTER
3/6/2023         RE: Donald Laughlin  [address] [facility] [address]        Dear Colleague,    Thank you for referring your patient, Donald Laughlin, to the [facility]. Please see a copy of my visit note below.    [facility] Dermatology Note  Encounter Date: Mar 6, 2023  Office Visit     Dermatology Problem List:  1. Seborrheic dermatitis  - Current tx: ketoconazole 2% cream  ____________________________________________    Assessment & Plan:    # Seborrheic dermatitis, currently flaring - chronic problem  - Start ketoconazole 2% cream daily until clear, then PRN for flares.  - edu on etiology  - offered ketoconazole shampoo, patient declined  - hold hydrocortisone cream     # Dermatitis, R forearm - mild  - Recommended OTC hydrocortisone PRN until clear.  - emollients BID    # Multiple benign nevi  - No concerning lesions today  - Monitor for ABCDEs of melanoma   - Continue sun protection - recommend SPF 30 or higher with frequent application   - Return sooner if noticing changing or symptomatic lesions    Procedures Performed:   None    Follow-up: prn for new or changing lesions    Staff and Scribe:     Scribe Disclosure:  I, Ottoniel Galan, am serving as a scribe to document services personally performed by Natividad Mix PA-C based on data collection and the provider's statements to me.   Provider Disclosure:   The documentation recorded by the scribe accurately reflects the services I personally performed and the decisions made by me.    All risks, benefits and alternatives were discussed with patient.  Patient is in agreement and understands the assessment and plan.  All questions were answered.    Natividad Mix PA-C, MPAS  [facility] [place]: Phone: [phone], Fax: [phone]  [facility]: Phone: [phone],  Fax: [phone]  [facility]  [place]: Phone: [phone], Fax: [phone]    ____________________________________________    CC: Rash (Rash on face off/on over the past 3-4 months)    HPI:  Mr. Donald Laughlin is a(n) 22 year old male who presents today as a new patient for a rash. Patient endorses a facial rash on and off since November 2022, extending to his eyebrows. He has tried antibiotics, which did not help, and OTC hydrocortisone, which helped for about 2 weeks. Additionally, patient endorses some moles on his chest that he would like examined.    Referred by Efren Alvarado MD.    Patient is otherwise feeling well, without additional skin concerns.    Labs Reviewed:  N/A    Physical Exam:  Vitals: There were no vitals taken for this visit.  SKIN: Focused examination of the scalp, face, R forearm, chest was performed.  - There is macular erythema of the glabella, nasolabial folds, scalp with moderate flaky white scale.  - Dry pink scaly patch on the R forearm.  - Multiple regular brown pigmented macules and papules are identified on the chest.   - No other lesions of concern on areas examined.     Medications:  Current Outpatient Medications   Medication     cephALEXin (KEFLEX) 500 MG capsule     citalopram (CELEXA) 20 MG tablet     albuterol (PROAIR HFA) 108 (90 Base) MCG/ACT inhaler     No current facility-administered medications for this visit.      Past Medical History:   Patient Active Problem List   Diagnosis     Anxiety     Right lower quadrant pain     Myopia of both eyes     Meibomian gland disease     Loose stools     Indigestion     Epigastric pain     Caries     Abnormal feces     Inactivity     No past medical history on file.     CC Efren Alvarado MD  [address] [place] [phone] on close of this encounter.      Again, thank you for allowing me to participate in the care of your patient.        Sincerely,        Natividad Mix PA-C

## 2023-03-06 NOTE — PROGRESS NOTES
[facility] Dermatology Note  Encounter Date: Mar 6, 2023  Office Visit     Dermatology Problem List:  1. Seborrheic dermatitis  - Current tx: ketoconazole 2% cream  ____________________________________________    Assessment & Plan:    # Seborrheic dermatitis, currently flaring - chronic problem  - Start ketoconazole 2% cream daily until clear, then PRN for flares.  - edu on etiology  - offered ketoconazole shampoo, patient declined  - hold hydrocortisone cream     # Dermatitis, R forearm - mild  - Recommended OTC hydrocortisone PRN until clear.  - emollients BID    # Multiple benign nevi  - No concerning lesions today  - Monitor for ABCDEs of melanoma   - Continue sun protection - recommend SPF 30 or higher with frequent application   - Return sooner if noticing changing or symptomatic lesions    Procedures Performed:   None    Follow-up: prn for new or changing lesions    Staff and Scribe:     Scribe Disclosure:  I, Ottoniel Galan, am serving as a scribe to document services personally performed by Natividad Mix PA-C based on data collection and the provider's statements to me.   Provider Disclosure:   The documentation recorded by the scribe accurately reflects the services I personally performed and the decisions made by me.    All risks, benefits and alternatives were discussed with patient.  Patient is in agreement and understands the assessment and plan.  All questions were answered.    Natividad Mix PA-C, [facility]  [facility] [place]: Phone: [phone], Fax: [phone]  [facility]: Phone: [phone],  Fax: [phone]  [facility]: Phone: [phone], Fax: [phone]    ____________________________________________    CC: Rash (Rash on face off/on over the past 3-4 months)    HPI:  Mr. Donald Laughlin is a(n) 22 year old male who presents today as a new patient for a rash.  Patient endorses a facial rash on and off since November 2022, extending to his eyebrows. He has tried antibiotics, which did not help, and OTC hydrocortisone, which helped for about 2 weeks. Additionally, patient endorses some moles on his chest that he would like examined.    Referred by Efren Alvarado MD.    Patient is otherwise feeling well, without additional skin concerns.    Labs Reviewed:  N/A    Physical Exam:  Vitals: There were no vitals taken for this visit.  SKIN: Focused examination of the scalp, face, R forearm, chest was performed.  - There is macular erythema of the glabella, nasolabial folds, scalp with moderate flaky white scale.  - Dry pink scaly patch on the R forearm.  - Multiple regular brown pigmented macules and papules are identified on the chest.   - No other lesions of concern on areas examined.     Medications:  Current Outpatient Medications   Medication     cephALEXin (KEFLEX) 500 MG capsule     citalopram (CELEXA) 20 MG tablet     albuterol (PROAIR HFA) 108 (90 Base) MCG/ACT inhaler     No current facility-administered medications for this visit.      Past Medical History:   Patient Active Problem List   Diagnosis     Anxiety     Right lower quadrant pain     Myopia of both eyes     Meibomian gland disease     Loose stools     Indigestion     Epigastric pain     Caries     Abnormal feces     Inactivity     No past medical history on file.     CC Efren Alvarado MD  [address] [place] [phone] on close of this encounter.

## 2023-06-08 ENCOUNTER — MYC MEDICAL ADVICE (OUTPATIENT)
Dept: INTERNAL MEDICINE | Facility: CLINIC | Age: 23
End: 2023-06-08
Payer: COMMERCIAL

## 2023-06-21 ENCOUNTER — OFFICE VISIT (OUTPATIENT)
Dept: FAMILY MEDICINE | Facility: CLINIC | Age: 23
End: 2023-06-21
Payer: COMMERCIAL

## 2023-06-21 VITALS
DIASTOLIC BLOOD PRESSURE: 72 MMHG | OXYGEN SATURATION: 96 % | SYSTOLIC BLOOD PRESSURE: 123 MMHG | HEART RATE: 87 BPM | TEMPERATURE: 99.2 F

## 2023-06-21 DIAGNOSIS — J06.9 VIRAL URI WITH COUGH: ICD-10-CM

## 2023-06-21 DIAGNOSIS — J02.9 ACUTE SORE THROAT: Primary | ICD-10-CM

## 2023-06-21 DIAGNOSIS — Z20.822 SUSPECTED 2019 NOVEL CORONAVIRUS INFECTION: ICD-10-CM

## 2023-06-21 DIAGNOSIS — R05.1 ACUTE COUGH: ICD-10-CM

## 2023-06-21 LAB
DEPRECATED S PYO AG THROAT QL EIA: NEGATIVE
GROUP A STREP BY PCR: NOT DETECTED
SARS-COV-2 RNA RESP QL NAA+PROBE: NEGATIVE

## 2023-06-21 PROCEDURE — 87635 SARS-COV-2 COVID-19 AMP PRB: CPT

## 2023-06-21 PROCEDURE — 99213 OFFICE O/P EST LOW 20 MIN: CPT

## 2023-06-21 PROCEDURE — 87651 STREP A DNA AMP PROBE: CPT

## 2023-06-21 RX ORDER — GUAIFENESIN AND DEXTROMETHORPHAN HYDROBROMIDE 600; 30 MG/1; MG/1
1 TABLET, EXTENDED RELEASE ORAL EVERY 12 HOURS
Qty: 14 TABLET | Refills: 0 | Status: SHIPPED | OUTPATIENT
Start: 2023-06-21 | End: 2023-07-05

## 2023-06-21 RX ORDER — IPRATROPIUM BROMIDE 42 UG/1
2 SPRAY, METERED NASAL 3 TIMES DAILY
Qty: 15 ML | Refills: 0 | Status: SHIPPED | OUTPATIENT
Start: 2023-06-21

## 2023-06-21 NOTE — PATIENT INSTRUCTIONS
If your covid test is positive, a [place] nurse will call you  If the strep PCR is positive, we will contact you via My chart

## 2023-07-05 ENCOUNTER — OFFICE VISIT (OUTPATIENT)
Dept: INTERNAL MEDICINE | Facility: CLINIC | Age: 23
End: 2023-07-05
Payer: MEDICAID

## 2023-07-05 VITALS
HEIGHT: 65 IN | TEMPERATURE: 98.3 F | DIASTOLIC BLOOD PRESSURE: 62 MMHG | OXYGEN SATURATION: 98 % | WEIGHT: 145 LBS | RESPIRATION RATE: 16 BRPM | SYSTOLIC BLOOD PRESSURE: 108 MMHG | HEART RATE: 65 BPM | BODY MASS INDEX: 24.16 KG/M2

## 2023-07-05 DIAGNOSIS — Z11.3 SCREEN FOR STD (SEXUALLY TRANSMITTED DISEASE): ICD-10-CM

## 2023-07-05 DIAGNOSIS — Z13.6 CARDIOVASCULAR SCREENING; LDL GOAL LESS THAN 160: ICD-10-CM

## 2023-07-05 DIAGNOSIS — Z00.00 ROUTINE GENERAL MEDICAL EXAMINATION AT A HEALTH CARE FACILITY: Primary | ICD-10-CM

## 2023-07-05 DIAGNOSIS — F41.1 GAD (GENERALIZED ANXIETY DISORDER): ICD-10-CM

## 2023-07-05 PROBLEM — K30 INDIGESTION: Status: RESOLVED | Noted: 2023-01-21 | Resolved: 2023-07-05

## 2023-07-05 PROBLEM — R19.5 LOOSE STOOLS: Status: RESOLVED | Noted: 2023-01-21 | Resolved: 2023-07-05

## 2023-07-05 LAB
ALBUMIN SERPL BCG-MCNC: 4.8 G/DL (ref 3.5–5.2)
ALP SERPL-CCNC: 79 U/L (ref 40–129)
ALT SERPL W P-5'-P-CCNC: 14 U/L (ref 0–70)
ANION GAP SERPL CALCULATED.3IONS-SCNC: 10 MMOL/L (ref 7–15)
AST SERPL W P-5'-P-CCNC: 23 U/L (ref 0–45)
BILIRUB SERPL-MCNC: 0.6 MG/DL
BUN SERPL-MCNC: 7.8 MG/DL (ref 6–20)
CALCIUM SERPL-MCNC: 9.8 MG/DL (ref 8.6–10)
CHLORIDE SERPL-SCNC: 105 MMOL/L (ref 98–107)
CHOLEST SERPL-MCNC: 148 MG/DL
CREAT SERPL-MCNC: 0.76 MG/DL (ref 0.67–1.17)
DEPRECATED HCO3 PLAS-SCNC: 26 MMOL/L (ref 22–29)
ERYTHROCYTE [DISTWIDTH] IN BLOOD BY AUTOMATED COUNT: 11.8 % (ref 10–15)
GFR SERPL CREATININE-BSD FRML MDRD: >90 ML/MIN/1.73M2
GLUCOSE SERPL-MCNC: 99 MG/DL (ref 70–99)
HCT VFR BLD AUTO: 41.1 % (ref 40–53)
HDLC SERPL-MCNC: 41 MG/DL
HGB BLD-MCNC: 14 G/DL (ref 13.3–17.7)
LDLC SERPL CALC-MCNC: 86 MG/DL
MCH RBC QN AUTO: 30.1 PG (ref 26.5–33)
MCHC RBC AUTO-ENTMCNC: 34.1 G/DL (ref 31.5–36.5)
MCV RBC AUTO: 88 FL (ref 78–100)
NONHDLC SERPL-MCNC: 107 MG/DL
PLATELET # BLD AUTO: 245 10E3/UL (ref 150–450)
POTASSIUM SERPL-SCNC: 4 MMOL/L (ref 3.4–5.3)
PROT SERPL-MCNC: 7.7 G/DL (ref 6.4–8.3)
RBC # BLD AUTO: 4.65 10E6/UL (ref 4.4–5.9)
SODIUM SERPL-SCNC: 141 MMOL/L (ref 136–145)
TRIGL SERPL-MCNC: 103 MG/DL
WBC # BLD AUTO: 4.8 10E3/UL (ref 4–11)

## 2023-07-05 PROCEDURE — 36415 COLL VENOUS BLD VENIPUNCTURE: CPT | Performed by: INTERNAL MEDICINE

## 2023-07-05 PROCEDURE — 80053 COMPREHEN METABOLIC PANEL: CPT | Performed by: INTERNAL MEDICINE

## 2023-07-05 PROCEDURE — 85027 COMPLETE CBC AUTOMATED: CPT | Performed by: INTERNAL MEDICINE

## 2023-07-05 PROCEDURE — 80061 LIPID PANEL: CPT | Performed by: INTERNAL MEDICINE

## 2023-07-05 PROCEDURE — 99395 PREV VISIT EST AGE 18-39: CPT | Performed by: INTERNAL MEDICINE

## 2023-07-05 RX ORDER — CITALOPRAM HYDROBROMIDE 20 MG/1
20 TABLET ORAL DAILY
Qty: 90 TABLET | Refills: 2 | Status: CANCELLED | OUTPATIENT
Start: 2023-07-05

## 2023-07-05 ASSESSMENT — ENCOUNTER SYMPTOMS
HEMATURIA: 0
COUGH: 0
HEARTBURN: 0
DIARRHEA: 0
FEVER: 0
WEAKNESS: 0
FREQUENCY: 0
CHILLS: 0
NERVOUS/ANXIOUS: 0
SORE THROAT: 0
CONSTIPATION: 0
EYE PAIN: 0
DYSURIA: 0
MYALGIAS: 0
DIZZINESS: 0
JOINT SWELLING: 0
HEMATOCHEZIA: 0
PARESTHESIAS: 0
HEADACHES: 0
ARTHRALGIAS: 0
ABDOMINAL PAIN: 0
PALPITATIONS: 1
SHORTNESS OF BREATH: 1
NAUSEA: 0

## 2023-07-05 NOTE — PROGRESS NOTES
SUBJECTIVE:   CC: Donald is an 23 year old who presents for preventative health visit.         HPI      Donald is here today for a general checkup.  He feels well.  He denies any major complaints.  He is working currently at "[employer] and thinking about starting school this fall at [employer].    He states he has stopped his citalopram as he just felt he did not need it and his medication was working well for him.  He denies any need to refill any further medications.    Is not interested in necessarily updating his COVID booster    Social History     Tobacco Use     Smoking status: Never     Passive exposure: Never     Smokeless tobacco: Never   Substance Use Topics     Alcohol use: Never           8/16/2022     1:38 PM   Alcohol Use   Prescreen: >3 drinks/day or >7 drinks/week? No     Reviewed orders with patient. Reviewed health maintenance and updated orders accordingly - Yes    Current Outpatient Medications   Medication     ipratropium (ATROVENT) 0.06 % nasal spray     ketoconazole (NIZORAL) 2 % external cream     albuterol (PROAIR HFA) 108 (90 Base) MCG/ACT inhaler     No current facility-administered medications for this visit.         Reviewed and updated as needed this visit by clinical staff                  Reviewed and updated as needed this visit by Provider                   Immunization History   Administered Date(s) Administered     BCG-Tuberculosis 10/07/2004     COVID-19 MONOVALENT 12+ (Pfizer) 07/12/2021, 08/02/2021     COVID-19 Monovalent 12+ (Pfizer 2022) 03/28/2022     Comvax (HIB/HepB) 2002, 2002, 09/01/2003     DTAP (<7y) 2002, 09/01/2003, 07/16/2004, 02/07/2008     HEPATITIS A (PEDS 12M-18Y) 12/11/2008, 06/28/2010     HIB (PRP-T) 2002, 2002, 09/01/2003     HPV Quadrivalent 07/13/2011, 07/17/2012, 07/22/2013     Hepatitis B (Peds <19Y) 2002, 2002, 09/01/2003     MMR 06/11/2003, 12/27/2005     MMR/V 02/20/2008     Meningococcal ACWY (Menactra )  "07/13/2011, 07/22/2013     Pneumococcal (PCV 7) 2002, 2002     Poliovirus, inactivated (IPV) 2002, 2002, 09/01/2003, 02/07/2008     TDAP (Adacel,Boostrix) 07/13/2011, 04/27/2022     Varicella 12/27/2005, 02/20/2008       Review of Systems  CONSTITUTIONAL: NEGATIVE for fever, chills, change in weight  INTEGUMENTARY/SKIN: NEGATIVE for worrisome rashes, moles or lesions  EYES: NEGATIVE for vision changes or irritation  ENT: NEGATIVE for ear, mouth and throat problems  RESP: NEGATIVE for significant cough or SOB  CV: NEGATIVE for chest pain, palpitations or peripheral edema  GI: NEGATIVE for nausea, abdominal pain, heartburn, or change in bowel habits   male: negative for dysuria, hematuria, decreased urinary stream, erectile dysfunction, urethral discharge  MUSCULOSKELETAL: NEGATIVE for significant arthralgias or myalgia  NEURO: NEGATIVE for weakness, dizziness or paresthesias  PSYCHIATRIC: NEGATIVE for changes in mood or affect    OBJECTIVE:   /62   Pulse 65   Temp 98.3  F (36.8  C) (Tympanic)   Resp 16   Ht 1.651 m (5' 5\")   Wt 65.8 kg (145 lb)   SpO2 98%   BMI 24.13 kg/m      Physical Exam  GENERAL: healthy, alert and no distress  EYES: Eyes grossly normal to inspection, PERRL and conjunctivae and sclerae normal  HENT: ear canals and TM's normal, nose and mouth without ulcers or lesions  RESP: lungs clear to auscultation - no rales, rhonchi or wheezes  CV: regular rate and rhythm, normal S1 S2, no S3 or S4, no murmur, click or rub  ABDOMEN: soft, nontender, no hepatosplenomegaly, no masses and bowel sounds normal  MS: no gross musculoskeletal defects noted, no edema  NEURO: No focal changes  Bilateral testicular exam is without obvious nodular change or mass.  No distinct hernias noted.  The penis is uncircumcised.  PSYCH: mentation appears normal, affect normal/bright    ASSESSMENT/PLAN:   (Z00.00) Routine general medical examination at a health care facility  ([facility] " encounter diagnosis)  Comment: Did updating COVID booster when able  Plan: CBC with platelets, Comprehensive metabolic         panel, Lipid Profile            (Z13.6) CARDIOVASCULAR SCREENING; LDL GOAL LESS THAN 160  Comment: Labs ordered as fasting per routine  Plan: Lipid Profile            (F41.1) FABIENNE (generalized anxiety disorder)  Comment: Discussed options with patient.  He is not interested in seeing a counselor or psychologist.  Plan:     (Z11.3) Screen for STD (sexually transmitted disease)  Comment: Offered STD screening but patient has declined.  Plan:     Patient has been advised of split billing requirements and indicates understanding: Yes    COUNSELING:   Reviewed preventive health counseling, as reflected in patient instructions       Regular exercise       Healthy diet/nutrition      He reports that he has never smoked. He has never been exposed to tobacco smoke. He has never used smokeless tobacco.    Efren Alvarado MD  [facility]

## 2023-07-14 ENCOUNTER — MYC MEDICAL ADVICE (OUTPATIENT)
Dept: INTERNAL MEDICINE | Facility: CLINIC | Age: 23
End: 2023-07-14
Payer: MEDICAID

## 2023-08-04 ENCOUNTER — OFFICE VISIT (OUTPATIENT)
Dept: URGENT CARE | Facility: URGENT CARE | Age: 23
End: 2023-08-04
Payer: MEDICAID

## 2023-08-04 VITALS
DIASTOLIC BLOOD PRESSURE: 65 MMHG | TEMPERATURE: 99 F | BODY MASS INDEX: 22.96 KG/M2 | OXYGEN SATURATION: 97 % | HEART RATE: 65 BPM | SYSTOLIC BLOOD PRESSURE: 115 MMHG | WEIGHT: 138 LBS

## 2023-08-04 DIAGNOSIS — M54.6 ACUTE RIGHT-SIDED THORACIC BACK PAIN: Primary | ICD-10-CM

## 2023-08-04 DIAGNOSIS — M54.6 ACUTE LEFT-SIDED THORACIC BACK PAIN: ICD-10-CM

## 2023-08-04 DIAGNOSIS — M79.18 MUSCULOSKELETAL PAIN: ICD-10-CM

## 2023-08-04 LAB
ALBUMIN SERPL BCG-MCNC: 5.2 G/DL (ref 3.5–5.2)
ALBUMIN UR-MCNC: ABNORMAL MG/DL
ALP SERPL-CCNC: 79 U/L (ref 40–129)
ALT SERPL W P-5'-P-CCNC: 9 U/L (ref 0–70)
ANION GAP SERPL CALCULATED.3IONS-SCNC: 10 MMOL/L (ref 7–15)
APPEARANCE UR: CLEAR
AST SERPL W P-5'-P-CCNC: 11 U/L (ref 0–45)
BACTERIA #/AREA URNS HPF: ABNORMAL /HPF
BASOPHILS # BLD AUTO: 0 10E3/UL (ref 0–0.2)
BASOPHILS NFR BLD AUTO: 0 %
BILIRUB SERPL-MCNC: 0.6 MG/DL
BILIRUB UR QL STRIP: NEGATIVE
BUN SERPL-MCNC: 9.5 MG/DL (ref 6–20)
C TRACH DNA SPEC QL NAA+PROBE: NEGATIVE
CALCIUM SERPL-MCNC: 9.6 MG/DL (ref 8.6–10)
CHLORIDE SERPL-SCNC: 103 MMOL/L (ref 98–107)
COLOR UR AUTO: YELLOW
CREAT SERPL-MCNC: 0.78 MG/DL (ref 0.67–1.17)
DEPRECATED HCO3 PLAS-SCNC: 27 MMOL/L (ref 22–29)
EOSINOPHIL # BLD AUTO: 0.1 10E3/UL (ref 0–0.7)
EOSINOPHIL NFR BLD AUTO: 2 %
ERYTHROCYTE [DISTWIDTH] IN BLOOD BY AUTOMATED COUNT: 12.1 % (ref 10–15)
GFR SERPL CREATININE-BSD FRML MDRD: >90 ML/MIN/1.73M2
GLUCOSE SERPL-MCNC: 97 MG/DL (ref 70–99)
GLUCOSE UR STRIP-MCNC: NEGATIVE MG/DL
HCT VFR BLD AUTO: 42.3 % (ref 40–53)
HGB BLD-MCNC: 14.2 G/DL (ref 13.3–17.7)
HGB UR QL STRIP: NEGATIVE
IMM GRANULOCYTES # BLD: 0 10E3/UL
IMM GRANULOCYTES NFR BLD: 0 %
KETONES UR STRIP-MCNC: NEGATIVE MG/DL
LEUKOCYTE ESTERASE UR QL STRIP: NEGATIVE
LYMPHOCYTES # BLD AUTO: 1.8 10E3/UL (ref 0.8–5.3)
LYMPHOCYTES NFR BLD AUTO: 41 %
MCH RBC QN AUTO: 30.3 PG (ref 26.5–33)
MCHC RBC AUTO-ENTMCNC: 33.6 G/DL (ref 31.5–36.5)
MCV RBC AUTO: 90 FL (ref 78–100)
MONOCYTES # BLD AUTO: 0.4 10E3/UL (ref 0–1.3)
MONOCYTES NFR BLD AUTO: 9 %
N GONORRHOEA DNA SPEC QL NAA+PROBE: NEGATIVE
NEUTROPHILS # BLD AUTO: 2.2 10E3/UL (ref 1.6–8.3)
NEUTROPHILS NFR BLD AUTO: 49 %
NITRATE UR QL: NEGATIVE
PH UR STRIP: 7 [PH] (ref 5–7)
PLATELET # BLD AUTO: 208 10E3/UL (ref 150–450)
POTASSIUM SERPL-SCNC: 3.6 MMOL/L (ref 3.4–5.3)
PROT SERPL-MCNC: 8 G/DL (ref 6.4–8.3)
RBC # BLD AUTO: 4.68 10E6/UL (ref 4.4–5.9)
RBC #/AREA URNS AUTO: ABNORMAL /HPF
SODIUM SERPL-SCNC: 140 MMOL/L (ref 136–145)
SP GR UR STRIP: 1.02 (ref 1–1.03)
UROBILINOGEN UR STRIP-ACNC: 0.2 E.U./DL
WBC # BLD AUTO: 4.4 10E3/UL (ref 4–11)
WBC #/AREA URNS AUTO: ABNORMAL /HPF

## 2023-08-04 PROCEDURE — 85025 COMPLETE CBC W/AUTO DIFF WBC: CPT | Performed by: PHYSICIAN ASSISTANT

## 2023-08-04 PROCEDURE — 99214 OFFICE O/P EST MOD 30 MIN: CPT | Performed by: PHYSICIAN ASSISTANT

## 2023-08-04 PROCEDURE — 36415 COLL VENOUS BLD VENIPUNCTURE: CPT | Performed by: PHYSICIAN ASSISTANT

## 2023-08-04 PROCEDURE — 80053 COMPREHEN METABOLIC PANEL: CPT | Performed by: PHYSICIAN ASSISTANT

## 2023-08-04 PROCEDURE — 87086 URINE CULTURE/COLONY COUNT: CPT | Performed by: PHYSICIAN ASSISTANT

## 2023-08-04 PROCEDURE — 87491 CHLMYD TRACH DNA AMP PROBE: CPT | Performed by: PHYSICIAN ASSISTANT

## 2023-08-04 PROCEDURE — 81001 URINALYSIS AUTO W/SCOPE: CPT | Performed by: PHYSICIAN ASSISTANT

## 2023-08-04 PROCEDURE — 87591 N.GONORRHOEAE DNA AMP PROB: CPT | Performed by: PHYSICIAN ASSISTANT

## 2023-08-04 RX ORDER — METHOCARBAMOL 750 MG/1
750 TABLET, FILM COATED ORAL 3 TIMES DAILY
Qty: 21 TABLET | Refills: 0 | Status: SHIPPED | OUTPATIENT
Start: 2023-08-04

## 2023-08-04 RX ORDER — NAPROXEN 500 MG/1
500 TABLET ORAL 2 TIMES DAILY WITH MEALS
Qty: 20 TABLET | Refills: 0 | Status: SHIPPED | OUTPATIENT
Start: 2023-08-04

## 2023-08-04 NOTE — PROGRESS NOTES
Assessment & Plan     Acute right-sided thoracic back pain    Patient is having left side pain tenderness for a few days and now the pain is on right side  Area is tender to the touch, localized and sharp.  Reproduced with palpation and movements  Seems to be more musculoskeletal related.    Patient states that he has not been constipated    UA done to evaluate for infection  STD pending  CBC neg for infection or anemia  CMP shows normal renal, hepatic, electrolytes    - NEISSERIA GONORRHOEA PCR  - CHLAMYDIA TRACHOMATIS PCR  - CBC with platelets and differential; Future  - Comprehensive metabolic panel (BMP + Alb, Alk Phos, ALT, AST, Total. Bili, TP)  - UA Microscopic with Reflex to Culture    - naproxen (NAPROSYN) 500 MG tablet; Take 1 tablet (500 mg) by mouth 2 times daily (with meals)  - methocarbamol (ROBAXIN) 750 MG tablet; Take 1 tablet (750 mg) by mouth 3 times daily  - Urine Culture    Musculoskeletal pain    Due to localized, reproducible side pain with palpation on muscles, this seems more MS related  No abdominal or back pain present  Normal labs and UA    Trial course :  - naproxen (NAPROSYN) 500 MG tablet; Take 1 tablet (500 mg) by mouth 2 times daily (with meals)  - methocarbamol (ROBAXIN) 750 MG tablet; Take 1 tablet (750 mg) by mouth 3 times daily    Acute left-sided thoracic back pain    If any symptoms worsen then go to the ED  Go to the ED if abdominal pain occurs  Go to the ED if fever, flu like symptoms appear  Patient may need imaging, CT scan, if this occurs    - Urine Culture    Review of external notes as documented elsewhere in note     At today's visit with Donald Laughlin , we discussed results, diagnosis, medications and formulated a plan.  We also discussed red flags for immediate return to clinic/ER, as well as indications for follow up with PCP if not improved in 3 days. Patient understood and agreed to plan. Donald Laughlin was discharged with stable vitals and has no  further questions.       No follow-ups on file.    Eren Grant, [facility], PA-C  [facility] U[facility]ENT [facility] TAY Bennett is a 23 year old, presenting for the following health issues:  Back Pain (Side pain also both sides x 5 days, no injury )      HPI   Review of Systems   Constitutional, HEENT, cardiovascular, pulmonary, GI, , musculoskeletal, neuro, skin, endocrine and psych systems are negative, except as otherwise noted.      Objective    /65 (BP Location: Right arm, Patient Position: Sitting, Cuff Size: Adult Regular)   Pulse 65   Temp 99  F (37.2  C) (Tympanic)   Wt 62.6 kg (138 lb)   SpO2 97%   BMI 22.96 kg/m    Body mass index is 22.96 kg/m .  Physical Exam   GENERAL: healthy, alert and no distress  EYES: Eyes grossly normal to inspection, PERRL and conjunctivae and sclerae normal  HENT: ear canals and TM's normal, nose and mouth without ulcers or lesions  NECK: no adenopathy, no asymmetry, masses, or scars and thyroid normal to palpation  RESP: lungs clear to auscultation - no rales, rhonchi or wheezes  CV: regular rate and rhythm, normal S1 S2, no S3 or S4, no murmur, click or rub, no peripheral edema and peripheral pulses strong  ABDOMEN: soft, nontender, no hepatosplenomegaly, no masses and bowel sounds normal  MS: Positive for tenderness on bilateral sides between rib cage and hips.  No back pain present, no flank pain present  SKIN: no suspicious lesions or rashes  NEURO: Normal strength and tone, mentation intact and speech normal  PSYCH: mentation appears normal, affect normal/bright        Results for orders placed or performed in visit on 08/04/23   UA with Microscopic reflex to Culture     Status: Abnormal    Specimen: Urine, Midstream   Result Value Ref Range    Color Urine Yellow Colorless, Straw, Light Yellow, Yellow    Appearance Urine Clear Clear    Glucose Urine Negative Negative mg/dL    Bilirubin Urine Negative Negative    Ketones Urine Negative Negative mg/dL     Specific Gravity Urine 1.020 1.003 - 1.035    Blood Urine Negative Negative    pH Urine 7.0 5.0 - 7.0    Protein Albumin Urine Trace (A) Negative mg/dL    Urobilinogen Urine 0.2 0.2, 1.0 E.U./dL    Nitrite Urine Negative Negative    Leukocyte Esterase Urine Negative Negative   Comprehensive metabolic panel (BMP + Alb, Alk Phos, ALT, AST, Total. Bili, TP)     Status: Normal   Result Value Ref Range    Sodium 140 136 - 145 mmol/L    Potassium 3.6 3.4 - 5.3 mmol/L    Chloride 103 98 - 107 mmol/L    Carbon Dioxide (CO2) 27 22 - 29 mmol/L    Anion Gap 10 7 - 15 mmol/L    Urea Nitrogen 9.5 6.0 - 20.0 mg/dL    Creatinine 0.78 0.67 - 1.17 mg/dL    Calcium 9.6 8.6 - 10.0 mg/dL    Glucose 97 70 - 99 mg/dL    Alkaline Phosphatase 79 40 - 129 U/L    AST 11 0 - 45 U/L    ALT 9 0 - 70 U/L    Protein Total 8.0 6.4 - 8.3 g/dL    Albumin 5.2 3.5 - 5.2 g/dL    Bilirubin Total 0.6 <=1.2 mg/dL    GFR Estimate >90 >60 mL/min/1.73m2   CBC with platelets and differential     Status: None   Result Value Ref Range    WBC Count 4.4 4.0 - 11.0 10e3/uL    RBC Count 4.68 4.40 - 5.90 10e6/uL    Hemoglobin 14.2 13.3 - 17.7 g/dL    Hematocrit 42.3 40.0 - 53.0 %    MCV 90 78 - 100 fL    MCH 30.3 26.5 - 33.0 pg    MCHC 33.6 31.5 - 36.5 g/dL    RDW 12.1 10.0 - 15.0 %    Platelet Count 208 150 - 450 10e3/uL    % Neutrophils 49 %    % Lymphocytes 41 %    % Monocytes 9 %    % Eosinophils 2 %    % Basophils 0 %    % Immature Granulocytes 0 %    Absolute Neutrophils 2.2 1.6 - 8.3 10e3/uL    Absolute Lymphocytes 1.8 0.8 - 5.3 10e3/uL    Absolute Monocytes 0.4 0.0 - 1.3 10e3/uL    Absolute Eosinophils 0.1 0.0 - 0.7 10e3/uL    Absolute Basophils 0.0 0.0 - 0.2 10e3/uL    Absolute Immature Granulocytes 0.0 <=0.4 10e3/uL   UA Microscopic with Reflex to Culture     Status: Abnormal   Result Value Ref Range    Bacteria Urine Few (A) None Seen /HPF    RBC Urine 0-2 0-2 /HPF /HPF    WBC Urine 0-5 0-5 /HPF /HPF    Narrative    Urine Culture not indicated   CBC  with platelets and differential     Status: None    Narrative    The following orders were created for panel order CBC with platelets and differential.  Procedure                               Abnormality         Status                     ---------                               -----------         ------                     CBC with platelets and d...[[id number]]                      Final result                 Please view results for these tests on the individual orders.

## 2023-08-06 LAB — BACTERIA UR CULT: NORMAL

## 2023-12-15 ENCOUNTER — OFFICE VISIT (OUTPATIENT)
Dept: URGENT CARE | Facility: URGENT CARE | Age: 23
End: 2023-12-15
Payer: COMMERCIAL

## 2023-12-15 VITALS
SYSTOLIC BLOOD PRESSURE: 115 MMHG | RESPIRATION RATE: 16 BRPM | DIASTOLIC BLOOD PRESSURE: 70 MMHG | HEART RATE: 92 BPM | OXYGEN SATURATION: 99 % | TEMPERATURE: 99.7 F

## 2023-12-15 DIAGNOSIS — R07.0 THROAT PAIN: Primary | ICD-10-CM

## 2023-12-15 DIAGNOSIS — J10.1 INFLUENZA B: ICD-10-CM

## 2023-12-15 DIAGNOSIS — J06.9 VIRAL URI WITH COUGH: ICD-10-CM

## 2023-12-15 LAB
DEPRECATED S PYO AG THROAT QL EIA: NEGATIVE
FLUAV AG SPEC QL IA: NEGATIVE
FLUBV AG SPEC QL IA: POSITIVE
GROUP A STREP BY PCR: NOT DETECTED
SARS-COV-2 RNA RESP QL NAA+PROBE: POSITIVE

## 2023-12-15 PROCEDURE — 99213 OFFICE O/P EST LOW 20 MIN: CPT | Performed by: FAMILY MEDICINE

## 2023-12-15 PROCEDURE — 87804 INFLUENZA ASSAY W/OPTIC: CPT | Performed by: FAMILY MEDICINE

## 2023-12-15 PROCEDURE — 87651 STREP A DNA AMP PROBE: CPT | Performed by: FAMILY MEDICINE

## 2023-12-15 PROCEDURE — 87635 SARS-COV-2 COVID-19 AMP PRB: CPT | Performed by: FAMILY MEDICINE

## 2023-12-15 RX ORDER — OSELTAMIVIR PHOSPHATE 75 MG/1
75 CAPSULE ORAL 2 TIMES DAILY
Qty: 10 CAPSULE | Refills: 0 | Status: SHIPPED | OUTPATIENT
Start: 2023-12-15 | End: 2023-12-20

## 2023-12-15 RX ORDER — BENZONATATE 100 MG/1
100 CAPSULE ORAL 3 TIMES DAILY PRN
Qty: 30 CAPSULE | Refills: 0 | Status: SHIPPED | OUTPATIENT
Start: 2023-12-15

## 2023-12-15 NOTE — PROGRESS NOTES
Chief Complaint   Patient presents with    Cough     Cough, sore throat, runny nose that is getting worse X 6 days       Donald was seen today for cough.    Diagnoses and all orders for this visit:    Throat pain  -     Symptomatic COVID-19 Virus (Coronavirus) by PCR Nose  -     Influenza A/B antigen  -     Streptococcus A Rapid Screen w/Reflex to PCR  -     Group A Streptococcus PCR Throat Swab    Influenza B  -     oseltamivir (TAMIFLU) 75 MG capsule; Take 1 capsule (75 mg) by mouth 2 times daily for 5 days    Viral URI with cough  -     benzonatate (TESSALON) 100 MG capsule; Take 1 capsule (100 mg) by mouth 3 times daily as needed      Results for orders placed or performed in visit on 12/15/23   Influenza A/B antigen     Status: Abnormal    Specimen: Nose; Swab   Result Value Ref Range    Influenza A antigen Negative Negative    Influenza B antigen Positive (A) Negative    Narrative    Test results must be correlated with clinical data. If necessary, results should be confirmed by a molecular assay or viral culture.   Streptococcus A Rapid Screen w/Reflex to PCR     Status: Normal    Specimen: Throat; Swab   Result Value Ref Range    Group A Strep antigen Negative Negative     ASSESSMENT:      Throat pain  Influenza B  Viral URI with cough    PLAN:   See orders in epic.   Symptomatic treat with gargles, lozenges, and OTC analgesic as needed. Follow-up with primary clinic if not improving.  Continue on tamiflu  Follow up if  symptoms fail to improve or worsens   Pt understood and agreed with plan     Elza Key MD       SUBJECTIVE:  Donald Laughlin is a 23 year old male with a chief complaint of sore throat.and cough and runny nose   Onset of symptoms was 5 day(s) ago  Symptoms worse for last 2 days .    Course of illness: sudden onset.  Severity moderate  Current and Associated symptoms: fever, runny nose, and sore throat  Treatment measures tried include Tylenol/Ibuprofen.  Predisposing factors include  None.    History reviewed. No pertinent past medical history.  Current Outpatient Medications   Medication Sig Dispense Refill    benzonatate (TESSALON) 100 MG capsule Take 1 capsule (100 mg) by mouth 3 times daily as needed 30 capsule 0    ipratropium (ATROVENT) 0.06 % nasal spray Spray 2 sprays into both nostrils 3 times daily 15 mL 0    ketoconazole (NIZORAL) 2 % external cream Apply topically daily 30 g 1    methocarbamol (ROBAXIN) 750 MG tablet Take 1 tablet (750 mg) by mouth 3 times daily 21 tablet 0    naproxen (NAPROSYN) 500 MG tablet Take 1 tablet (500 mg) by mouth 2 times daily (with meals) 20 tablet 0    oseltamivir (TAMIFLU) 75 MG capsule Take 1 capsule (75 mg) by mouth 2 times daily for 5 days 10 capsule 0    albuterol (PROAIR HFA) 108 (90 Base) MCG/ACT inhaler Inhale 2 puffs into the lungs every 6 hours for 30 days 18 g 0     Social History     Tobacco Use    Smoking status: Never     Passive exposure: Never    Smokeless tobacco: Never   Substance Use Topics    Alcohol use: Never       ROS:  Review of systems negative except as stated above.    OBJECTIVE:   /70   Pulse 92   Temp 99.7  F (37.6  C) (Tympanic)   Resp 16   SpO2 99%   GENERAL APPEARANCE: healthy, alert and no distress  EYES: EOMI,  PERRL, conjunctiva clear  HENT: ear canals and TM's normal.  Nose normal.  Pharynx  mild erythema with no  exudate noted.  NECK: supple, non-tender to palpation, no adenopathy noted  RESP: lungs clear to auscultation - no rales, rhonchi or wheezes  CV: regular rates and rhythm, normal S1 S2, no murmur noted  ABDOMEN:  soft, nontender, no HSM or masses and bowel sounds normal  SKIN: no suspicious lesions or rashes  PSYCH: mentation appears normal    Elza Key MD

## 2023-12-15 NOTE — PATIENT INSTRUCTIONS
Please finish the antiviral  even if you are feeling better.  Watch for worsening signs of infection    Stay well hydrated       Elza Key MD

## 2023-12-16 ENCOUNTER — TELEPHONE (OUTPATIENT)
Dept: NURSING | Facility: CLINIC | Age: 23
End: 2023-12-16
Payer: COMMERCIAL

## 2023-12-16 ENCOUNTER — MYC MEDICAL ADVICE (OUTPATIENT)
Dept: INTERNAL MEDICINE | Facility: CLINIC | Age: 23
End: 2023-12-16
Payer: COMMERCIAL

## 2023-12-16 NOTE — TELEPHONE ENCOUNTER
Patient classified as COVID treatment eligible by Epic high risk algorithm:  Yes    Coronavirus (COVID-19) Notification    Reason for call  Notify of POSITIVE COVID-19 lab result, assess symptoms,  review [facility] recommendations    Lab Result   Lab test for 2019-nCoV rRt-PCR or SARS-COV-2 PCR  Oropharyngeal AND/OR nasopharyngeal swabs were POSITIVE for 2019-nCoV RNA [OR] SARS-COV-2 RNA (COVID-19) RNA     We have been unable to reach patient by phone at this time to notify of their Positive COVID-19 result.    Left voicemail message requesting a call back to [phone] [facility] for results.        A Positive COVID-19 letter will be sent via [facility] or the mail.    Christal Martinez

## 2023-12-18 NOTE — TELEPHONE ENCOUNTER
Please see  message and advise.     Per chart review- pt is taking tamiflu and started on 12/15/23.
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
## 2024-06-05 ENCOUNTER — PATIENT OUTREACH (OUTPATIENT)
Dept: CARE COORDINATION | Facility: CLINIC | Age: 24
End: 2024-06-05

## 2024-06-19 ENCOUNTER — PATIENT OUTREACH (OUTPATIENT)
Dept: CARE COORDINATION | Facility: CLINIC | Age: 24
End: 2024-06-19

## 2024-06-26 ENCOUNTER — MYC MEDICAL ADVICE (OUTPATIENT)
Dept: INTERNAL MEDICINE | Facility: CLINIC | Age: 24
End: 2024-06-26

## 2024-09-15 ENCOUNTER — HEALTH MAINTENANCE LETTER (OUTPATIENT)
Age: 24
End: 2024-09-15

## 2024-10-13 SDOH — HEALTH STABILITY: PHYSICAL HEALTH: ON AVERAGE, HOW MANY DAYS PER WEEK DO YOU ENGAGE IN MODERATE TO STRENUOUS EXERCISE (LIKE A BRISK WALK)?: 5 DAYS

## 2024-10-13 SDOH — HEALTH STABILITY: PHYSICAL HEALTH: ON AVERAGE, HOW MANY MINUTES DO YOU ENGAGE IN EXERCISE AT THIS LEVEL?: 30 MIN

## 2024-10-13 ASSESSMENT — SOCIAL DETERMINANTS OF HEALTH (SDOH): HOW OFTEN DO YOU GET TOGETHER WITH FRIENDS OR RELATIVES?: MORE THAN THREE TIMES A WEEK

## 2024-10-14 ENCOUNTER — OFFICE VISIT (OUTPATIENT)
Dept: INTERNAL MEDICINE | Facility: CLINIC | Age: 24
End: 2024-10-14
Payer: COMMERCIAL

## 2024-10-14 VITALS
DIASTOLIC BLOOD PRESSURE: 68 MMHG | OXYGEN SATURATION: 98 % | HEIGHT: 66 IN | WEIGHT: 135.9 LBS | SYSTOLIC BLOOD PRESSURE: 108 MMHG | TEMPERATURE: 98.2 F | BODY MASS INDEX: 21.84 KG/M2 | HEART RATE: 67 BPM

## 2024-10-14 DIAGNOSIS — Z13.220 SCREENING CHOLESTEROL LEVEL: ICD-10-CM

## 2024-10-14 DIAGNOSIS — Z00.00 ANNUAL PHYSICAL EXAM: Primary | ICD-10-CM

## 2024-10-14 DIAGNOSIS — Z11.3 SCREENING EXAMINATION FOR STI: ICD-10-CM

## 2024-10-14 DIAGNOSIS — R00.2 HEART PALPITATIONS: ICD-10-CM

## 2024-10-14 LAB
ALBUMIN SERPL BCG-MCNC: 4.8 G/DL (ref 3.5–5.2)
ALP SERPL-CCNC: 84 U/L (ref 40–150)
ALT SERPL W P-5'-P-CCNC: 11 U/L (ref 0–70)
ANION GAP SERPL CALCULATED.3IONS-SCNC: 8 MMOL/L (ref 7–15)
AST SERPL W P-5'-P-CCNC: 21 U/L (ref 0–45)
BILIRUB SERPL-MCNC: 0.6 MG/DL
BUN SERPL-MCNC: 11.4 MG/DL (ref 6–20)
CALCIUM SERPL-MCNC: 9.5 MG/DL (ref 8.8–10.4)
CHLORIDE SERPL-SCNC: 103 MMOL/L (ref 98–107)
CHOLEST SERPL-MCNC: 136 MG/DL
CREAT SERPL-MCNC: 0.83 MG/DL (ref 0.67–1.17)
EGFRCR SERPLBLD CKD-EPI 2021: >90 ML/MIN/1.73M2
FASTING STATUS PATIENT QL REPORTED: NORMAL
FASTING STATUS PATIENT QL REPORTED: NORMAL
GLUCOSE SERPL-MCNC: 92 MG/DL (ref 70–99)
HCO3 SERPL-SCNC: 25 MMOL/L (ref 22–29)
HCV AB SERPL QL IA: NONREACTIVE
HDLC SERPL-MCNC: 50 MG/DL
HIV 1+2 AB+HIV1 P24 AG SERPL QL IA: NONREACTIVE
LDLC SERPL CALC-MCNC: 77 MG/DL
NONHDLC SERPL-MCNC: 86 MG/DL
POTASSIUM SERPL-SCNC: 4.6 MMOL/L (ref 3.4–5.3)
PROT SERPL-MCNC: 7.8 G/DL (ref 6.4–8.3)
SODIUM SERPL-SCNC: 136 MMOL/L (ref 135–145)
T PALLIDUM AB SER QL: NONREACTIVE
TRIGL SERPL-MCNC: 46 MG/DL

## 2024-10-14 PROCEDURE — 90656 IIV3 VACC NO PRSV 0.5 ML IM: CPT | Performed by: NURSE PRACTITIONER

## 2024-10-14 PROCEDURE — 90471 IMMUNIZATION ADMIN: CPT | Performed by: NURSE PRACTITIONER

## 2024-10-14 PROCEDURE — 87591 N.GONORRHOEAE DNA AMP PROB: CPT | Performed by: NURSE PRACTITIONER

## 2024-10-14 PROCEDURE — 90480 ADMN SARSCOV2 VAC 1/ONLY CMP: CPT | Performed by: NURSE PRACTITIONER

## 2024-10-14 PROCEDURE — 86780 TREPONEMA PALLIDUM: CPT | Performed by: NURSE PRACTITIONER

## 2024-10-14 PROCEDURE — 36415 COLL VENOUS BLD VENIPUNCTURE: CPT | Performed by: NURSE PRACTITIONER

## 2024-10-14 PROCEDURE — 86803 HEPATITIS C AB TEST: CPT | Performed by: NURSE PRACTITIONER

## 2024-10-14 PROCEDURE — 80061 LIPID PANEL: CPT | Performed by: NURSE PRACTITIONER

## 2024-10-14 PROCEDURE — 80053 COMPREHEN METABOLIC PANEL: CPT | Performed by: NURSE PRACTITIONER

## 2024-10-14 PROCEDURE — 91320 SARSCV2 VAC 30MCG TRS-SUC IM: CPT | Performed by: NURSE PRACTITIONER

## 2024-10-14 PROCEDURE — 99395 PREV VISIT EST AGE 18-39: CPT | Mod: 25 | Performed by: NURSE PRACTITIONER

## 2024-10-14 PROCEDURE — 87491 CHLMYD TRACH DNA AMP PROBE: CPT | Performed by: NURSE PRACTITIONER

## 2024-10-14 PROCEDURE — 87389 HIV-1 AG W/HIV-1&-2 AB AG IA: CPT | Performed by: NURSE PRACTITIONER

## 2024-10-14 NOTE — PROGRESS NOTES
Preventive Care Visit  [facility]  CALLIE Mendez CNP, Internal Medicine  Oct 14, 2024      Assessment & Plan     (Z00.00) Annual physical exam  (primary encounter diagnosis)  Comment: Past medical history, surgical history, family history, social history, sexual history, medications, allergies, and immunizations reviewed and updated as appropriate.   Care gaps addressed, including routine screenings.  Annual lab work ordered.  Physical exam unremarkable, except as noted.  Diet/exercise recommendations discussed.  Plan: Comprehensive metabolic panel    (R00.2) Heart palpitations  Comment: Symptoms are stable and have improved with eating a more healthy and balanced diet.  He gets adequate fluid intake and does not drink excessive amounts of caffeine.  He may have some component of orthostatic symptoms given dizziness when he stands up too quickly.    (Z13.220) Screening cholesterol level  Comment: Labs ordered for screening.  Plan: Lipid panel         reflex to direct LDL Fasting,    (Z11.3) Screening examination for STI  Comment: Labs ordered for screening.  Plan: HCV Antibody         w/Reflex to HCV RNA, HIV Antigen Antibody         Combo, Treponema Abs w Reflex to RPR and Titer,        Chlamydia trachomatis/Neisseria gonorrhoeae by         PCR - Clinic Collect, Chlamydia         trachomatis/Neisseria gonorrhoeae by PCR,         CANCELED: Chlamydia trachomatis/Neisseria         gonorrhoeae by PCR - Clinic Collect       Patient has been advised of split billing requirements and indicates understanding: Yes    Counseling  Appropriate preventive services were addressed with this patient via screening, questionnaire, or discussion as appropriate for fall prevention, nutrition, physical activity, Tobacco-use cessation, social engagement, weight loss and cognition.  Checklist reviewing preventive services available has been given to the patient.  Reviewed patient's diet, addressing  "concerns and/or questions.   The patient was instructed to see the dentist every 6 months.       Subjective   Donald is a 24 year old, presenting for the following:  Physical    Here for annual exam.  Reports his health has been stable.  The following chronic concerns were addressed today:    Heart palpitations:  Reports symptoms are chronic and have been evaluated previously with blood work and a heart monitor, all of which was unremarkable.  He reports occasional symptoms, approximately twice weekly, associated with heavy lifting, movement, and standing up too quickly.  He feels dizzy with heart palpitations but has not fallen down  He drinks 1 cup of coffee per day and 62 ounces of water per day.  Symptoms have improved with \"eating more whole foods and not snacking as much.\"  He declines further intervention today.    2.  Interested in peace-of-mind STI screening today.    Health Maintenance:  Vaccines due: influenza and COVID-19  Colon cancer screening: N/A. Family history of colon cancer?: no  DEXA: N/A.  Screening chest CT: N/A. Family history of lung cancer?: no  AAA screen: N/A  Vision: No concerns.  Wears glasses.  Last vision exam was 1 year ago.  Dental: No concerns.   Hearing: No concerns.           10/13/2024   General Health   How would you rate your overall physical health? Good   Feel stress (tense, anxious, or unable to sleep) Not at all            10/13/2024   Nutrition   Three or more servings of calcium each day? Yes   Diet: Regular (no restrictions)   How many servings of fruit and vegetables per day? 4 or more   How many sweetened beverages each day? 0-1            10/13/2024   Exercise   Days per week of moderate/strenous exercise 5 days   Average minutes spent exercising at this level 30 min            10/13/2024   Social Factors   Frequency of gathering with friends or relatives More than three times a week   Worry food won't last until get money to buy more No   Food not last or not have " "enough money for food? No   Do you have housing? (Housing is defined as stable permanent housing and does not include staying ouside in a car, in a tent, in an abandoned building, in an overnight shelter, or couch-surfing.) Yes   Are you worried about losing your housing? No   Lack of transportation? No   Unable to get utilities (heat,electricity)? No            10/13/2024   Dental   Dentist two times every year? (!) NO            8/26/2024   TB Screening   Were you born outside of the [place]? No      Today's PHQ-2 Score:       10/13/2024     9:42 PM   PHQ-2 ( 1999 Pfizer)   Q1: Little interest or pleasure in doing things 0   Q2: Feeling down, depressed or hopeless 0   PHQ-2 Score 0   Q1: Little interest or pleasure in doing things Not at all   Q2: Feeling down, depressed or hopeless Not at all   PHQ-2 Score 0           10/13/2024   Substance Use   Alcohol more than 3/day or more than 7/wk No   Do you use any other substances recreationally? No        Social History     Tobacco Use    Smoking status: Never     Passive exposure: Never    Smokeless tobacco: Never   Vaping Use    Vaping status: Never Used   Substance Use Topics    Alcohol use: Never    Drug use: Never         10/13/2024   STI Screening   New sexual partner(s) since last STI/HIV test? No            10/13/2024   Contraception/Family Planning   Questions about contraception or family planning No      Reviewed and updated as needed this visit by Provider   Tobacco   Meds  Problems  Med Hx  Surg Hx  Fam Hx  Soc Hx Sexual   Activity            Review of Systems  Constitutional, HEENT, cardiovascular, pulmonary, GI, , musculoskeletal, neuro, skin, endocrine and psych systems are negative, except as otherwise noted.     Objective    Exam  /68   Pulse 67   Temp 98.2  F (36.8  C) (Temporal)   Ht 1.664 m (5' 5.5\")   Wt 61.6 kg (135 lb 14.4 oz)   SpO2 98%   BMI 22.27 kg/m     Estimated body mass index is 22.27 kg/m  as calculated from the " "following:    Height as of this encounter: 1.664 m (5' 5.5\").    Weight as of this encounter: 61.6 kg (135 lb 14.4 oz).    Physical Exam  Vitals and nursing note reviewed.   Constitutional:       Appearance: Normal appearance. He is normal weight.   HENT:      Head: Normocephalic and atraumatic.      Right Ear: Tympanic membrane, ear canal and external ear normal.      Left Ear: Tympanic membrane, ear canal and external ear normal.      Nose: Nose normal.      Mouth/Throat:      Pharynx: Oropharynx is clear.   Eyes:      Extraocular Movements: Extraocular movements intact.      Conjunctiva/sclera: Conjunctivae normal.      Pupils: Pupils are equal, round, and reactive to light.   Cardiovascular:      Rate and Rhythm: Normal rate and regular rhythm.      Pulses: Normal pulses.      Heart sounds: Normal heart sounds. No murmur heard.     No friction rub. No gallop.   Pulmonary:      Effort: Pulmonary effort is normal. No respiratory distress.      Breath sounds: Normal breath sounds. No wheezing, rhonchi or rales.   Abdominal:      General: Abdomen is flat. Bowel sounds are decreased. There is no distension.      Palpations: Abdomen is soft. There is no mass.      Tenderness: There is no abdominal tenderness. There is no guarding.      Hernia: No hernia is present.   Musculoskeletal:         General: No swelling. Normal range of motion.      Cervical back: Normal range of motion and neck supple.   Skin:     General: Skin is warm and dry.   Neurological:      General: No focal deficit present.      Mental Status: He is alert and oriented to person, place, and time.      Deep Tendon Reflexes:      Reflex Scores:       Patellar reflexes are 2+ on the right side and 1+ on the left side.  Psychiatric:         Mood and Affect: Mood normal.         Behavior: Behavior normal.         Thought Content: Thought content normal.         Judgment: Judgment normal.      Comments: Flattened affect       Signed Electronically by: " CALLIE Mendez CNP

## 2024-10-15 LAB
C TRACH DNA SPEC QL PROBE+SIG AMP: NEGATIVE
N GONORRHOEA DNA SPEC QL NAA+PROBE: NEGATIVE

## 2024-10-16 LAB
C TRACH DNA SPEC QL PROBE+SIG AMP: NEGATIVE
N GONORRHOEA DNA SPEC QL NAA+PROBE: NEGATIVE

## 2025-02-03 ENCOUNTER — OFFICE VISIT (OUTPATIENT)
Dept: URGENT CARE | Facility: URGENT CARE | Age: 25
End: 2025-02-03
Payer: COMMERCIAL

## 2025-02-03 ENCOUNTER — ANCILLARY PROCEDURE (OUTPATIENT)
Dept: GENERAL RADIOLOGY | Facility: CLINIC | Age: 25
End: 2025-02-03
Attending: PHYSICIAN ASSISTANT
Payer: COMMERCIAL

## 2025-02-03 VITALS
OXYGEN SATURATION: 100 % | WEIGHT: 140 LBS | RESPIRATION RATE: 20 BRPM | DIASTOLIC BLOOD PRESSURE: 70 MMHG | SYSTOLIC BLOOD PRESSURE: 110 MMHG | TEMPERATURE: 99 F | HEART RATE: 77 BPM | BODY MASS INDEX: 22.94 KG/M2

## 2025-02-03 DIAGNOSIS — R00.2 PALPITATIONS: ICD-10-CM

## 2025-02-03 DIAGNOSIS — R07.9 INTERMITTENT CHEST PAIN: ICD-10-CM

## 2025-02-03 DIAGNOSIS — J06.9 UPPER RESPIRATORY TRACT INFECTION, UNSPECIFIED TYPE: ICD-10-CM

## 2025-02-03 DIAGNOSIS — R50.9 FEVER, UNSPECIFIED FEVER CAUSE: ICD-10-CM

## 2025-02-03 DIAGNOSIS — J02.9 SORE THROAT: Primary | ICD-10-CM

## 2025-02-03 LAB
BASOPHILS # BLD AUTO: 0 10E3/UL (ref 0–0.2)
BASOPHILS NFR BLD AUTO: 0 %
DEPRECATED S PYO AG THROAT QL EIA: NEGATIVE
EOSINOPHIL # BLD AUTO: 0.1 10E3/UL (ref 0–0.7)
EOSINOPHIL NFR BLD AUTO: 2 %
ERYTHROCYTE [DISTWIDTH] IN BLOOD BY AUTOMATED COUNT: 11.9 % (ref 10–15)
ERYTHROCYTE [SEDIMENTATION RATE] IN BLOOD BY WESTERGREN METHOD: 6 MM/HR (ref 0–15)
FLUAV AG SPEC QL IA: NEGATIVE
FLUBV AG SPEC QL IA: NEGATIVE
HCT VFR BLD AUTO: 41.5 % (ref 40–53)
HGB BLD-MCNC: 14.3 G/DL (ref 13.3–17.7)
IMM GRANULOCYTES # BLD: 0 10E3/UL
IMM GRANULOCYTES NFR BLD: 0 %
LYMPHOCYTES # BLD AUTO: 1.7 10E3/UL (ref 0.8–5.3)
LYMPHOCYTES NFR BLD AUTO: 38 %
MCH RBC QN AUTO: 29.9 PG (ref 26.5–33)
MCHC RBC AUTO-ENTMCNC: 34.5 G/DL (ref 31.5–36.5)
MCV RBC AUTO: 87 FL (ref 78–100)
MONOCYTES # BLD AUTO: 0.5 10E3/UL (ref 0–1.3)
MONOCYTES NFR BLD AUTO: 11 %
NEUTROPHILS # BLD AUTO: 2.2 10E3/UL (ref 1.6–8.3)
NEUTROPHILS NFR BLD AUTO: 49 %
PLATELET # BLD AUTO: 221 10E3/UL (ref 150–450)
RBC # BLD AUTO: 4.78 10E6/UL (ref 4.4–5.9)
S PYO DNA THROAT QL NAA+PROBE: NOT DETECTED
TROPONIN T SERPL HS-MCNC: <6 NG/L
WBC # BLD AUTO: 4.5 10E3/UL (ref 4–11)

## 2025-02-03 PROCEDURE — 87651 STREP A DNA AMP PROBE: CPT | Performed by: PHYSICIAN ASSISTANT

## 2025-02-03 PROCEDURE — 71046 X-RAY EXAM CHEST 2 VIEWS: CPT | Mod: TC | Performed by: RADIOLOGY

## 2025-02-03 PROCEDURE — 85025 COMPLETE CBC W/AUTO DIFF WBC: CPT | Performed by: PHYSICIAN ASSISTANT

## 2025-02-03 PROCEDURE — 93000 ELECTROCARDIOGRAM COMPLETE: CPT | Performed by: PHYSICIAN ASSISTANT

## 2025-02-03 PROCEDURE — 36415 COLL VENOUS BLD VENIPUNCTURE: CPT | Performed by: PHYSICIAN ASSISTANT

## 2025-02-03 PROCEDURE — 84484 ASSAY OF TROPONIN QUANT: CPT | Performed by: PHYSICIAN ASSISTANT

## 2025-02-03 PROCEDURE — 99214 OFFICE O/P EST MOD 30 MIN: CPT | Performed by: PHYSICIAN ASSISTANT

## 2025-02-03 PROCEDURE — 87804 INFLUENZA ASSAY W/OPTIC: CPT | Performed by: PHYSICIAN ASSISTANT

## 2025-02-03 PROCEDURE — 85652 RBC SED RATE AUTOMATED: CPT | Performed by: PHYSICIAN ASSISTANT

## 2025-02-03 RX ORDER — BENZONATATE 200 MG/1
200 CAPSULE ORAL 3 TIMES DAILY PRN
Qty: 21 CAPSULE | Refills: 0 | Status: SHIPPED | OUTPATIENT
Start: 2025-02-03 | End: 2025-02-10

## 2025-02-03 RX ORDER — NAPROXEN 500 MG/1
500 TABLET ORAL 2 TIMES DAILY WITH MEALS
Qty: 30 TABLET | Refills: 3 | Status: SHIPPED | OUTPATIENT
Start: 2025-02-03 | End: 2026-02-03

## 2025-02-03 NOTE — PROGRESS NOTES
Assessment & Plan     Sore throat    You had a strep test done today that was neg.  We will perform a strep culture and if any positive results come back we will call you and call in antibiotics.  At this time, this appears to be a viral infection and requires symptomatic treatment.  Most viral sore throats will resolve with in a few days.  If your throat pain worsens then please be  rechecked in the  or by your PCP.    Strep neg, culture pending  ESR normal    Naprosyn for sore throat  - ESR: Erythrocyte sedimentation rate  - Streptococcus A Rapid Screen w/Reflex to PCR  - Group A Streptococcus PCR Throat Swab  - ESR: Erythrocyte sedimentation rate    Intermittent chest pain    EKG and Troponin neg for cardiac problems  Chest ray Negative for acute findings, read by Eren BAXTER at time of visit.  ESR and neg for signs of pericarditis    Naprosyn for chest wall pain  - EKG 12-lead complete w/read - Clinics  - XR Chest 2 Views  - ESR: Erythrocyte sedimentation rate  - CBC with platelets and differential  - Troponin T, High Sensitivity    - naproxen (NAPROSYN) 500 MG tablet  Dispense: 30 tablet; Refill: 3    Palpitations    EKG neg for ectopic beats  Chest xray Negative for acute findings, read by Eren BAXTER at time of visit.    - EKG 12-lead complete w/read - Clinics  - XR Chest 2 Views    Fever, unspecified fever cause    A fever is a high body temperature and is the body's reaction to an illness. It's one way your body fights illness. A temperature of up to 102 F can be helpful, because it helps the body respond to infection. Most healthy people can have a fever as high as 103 F to 104 F for short periods of time without problems.  Its important to stay well hydrated with a fever and avoid being in the heat.  A fever can be treated with medications provided, but If symptoms worsen then be seen by your PCP or go to the .     CBC normal  Strep neg  Infuenza neg    - CBC with platelets and  differential  - Streptococcus A Rapid Screen w/Reflex to PCR  - Influenza A/B antigen  - Troponin T, High Sensitivity  - Group A Streptococcus PCR Throat Swab  - CBC with platelets and differential  - Troponin T, High Sensitivity  - naproxen (NAPROSYN) 500 MG tablet  Dispense: 30 tablet; Refill: 3    Upper respiratory tract infection, unspecified type    You have been diagnosed with a viral URI.  A viral respiratory infection is an infection of the nose, sinuses, or throat caused by a virus. Colds and the flu are common types of viral respiratory infections.  The symptoms of a viral respiratory infection often start quickly. They include a fever, sore throat, and runny nose. You may also just not feel well. Or you may not want to eat much.  Most viral infections can be treated with home care. This may include drinking lots of fluids and taking over-the-counter pain medicine. You will probably feel better in 4 to 10 days.  Antibiotics are not used to treat a viral infection. Antibiotics don't kill viruses, so they won't help cure a viral illness.    - benzonatate (TESSALON) 200 MG capsule  Dispense: 21 capsule; Refill: 0       At today's visit with Donald Laughlin , we discussed results, diagnosis, medications and formulated a plan.  We also discussed red flags for immediate return to clinic/ER, as well as indications for follow up with PCP if not improved in 3 days. Patient understood and agreed to plan. Donald Laughlin was discharged with stable vitals and has no further questions.       No follow-ups on file.    Eren Grant, [facility], PA-C  [facility]     Donald is a 24 year old male who presents to clinic today for the following health issues:  Chief Complaint   Patient presents with    Breathing Problem     Palpitations, dizziness, light headed. Symptoms since Saturday. He is fatigued and eating well. He has had a sore throat and a cough.       HPI  Review of  Systems  Constitutional, HEENT, cardiovascular, pulmonary, GI, , musculoskeletal, neuro, skin, endocrine and psych systems are negative, except as otherwise noted.      Objective    /70   Pulse 77   Temp 99  F (37.2  C)   Resp 20   Wt 63.5 kg (140 lb)   SpO2 100%   BMI 22.94 kg/m    Physical Exam   GENERAL: alert and no distress  EYES: Eyes grossly normal to inspection, PERRL and conjunctivae and sclerae normal  HENT: ear canals and TM's normal, nose and mouth without ulcers or lesions  NECK: no adenopathy, no asymmetry, masses, or scars  RESP: lungs clear to auscultation - no rales, rhonchi or wheezes  CV: regular rate and rhythm, normal S1 S2, no S3 or S4, no murmur, click or rub, no peripheral edema  ABDOMEN: soft, nontender, no hepatosplenomegaly, no masses and bowel sounds normal  MS: no gross musculoskeletal defects noted, no edema  SKIN: no suspicious lesions or rashes  NEURO: Normal strength and tone, mentation intact and speech normal  PSYCH: mentation appears normal, affect normal/bright      Chest xray Negative for acute findings, read by Eren BAXTER at time of visit.    EKG neg for ST changes [facility]ren Grant [facility]    Results for orders placed or performed in visit on 02/03/25   ESR: Erythrocyte sedimentation rate     Status: Normal   Result Value Ref Range    Erythrocyte Sedimentation Rate 6 0 - 15 mm/hr   CBC with platelets and differential     Status: None   Result Value Ref Range    WBC Count 4.5 4.0 - 11.0 10e3/uL    RBC Count 4.78 4.40 - 5.90 10e6/uL    Hemoglobin 14.3 13.3 - 17.7 g/dL    Hematocrit 41.5 40.0 - 53.0 %    MCV 87 78 - 100 fL    MCH 29.9 26.5 - 33.0 pg    MCHC 34.5 31.5 - 36.5 g/dL    RDW 11.9 10.0 - 15.0 %    Platelet Count 221 150 - 450 10e3/uL    % Neutrophils 49 %    % Lymphocytes 38 %    % Monocytes 11 %    % Eosinophils 2 %    % Basophils 0 %    % Immature Granulocytes 0 %    Absolute Neutrophils 2.2 1.6 - 8.3 10e3/uL    Absolute Lymphocytes 1.7 0.8 -  5.3 10e3/uL    Absolute Monocytes 0.5 0.0 - 1.3 10e3/uL    Absolute Eosinophils 0.1 0.0 - 0.7 10e3/uL    Absolute Basophils 0.0 0.0 - 0.2 10e3/uL    Absolute Immature Granulocytes 0.0 <=0.4 10e3/uL   Streptococcus A Rapid Screen w/Reflex to PCR     Status: Normal    Specimen: Throat; Swab   Result Value Ref Range    Group A Strep antigen Negative Negative   Influenza A/B antigen     Status: Normal    Specimen: Nose; Swab   Result Value Ref Range    Influenza A antigen Negative Negative    Influenza B antigen Negative Negative    Narrative    Test results must be correlated with clinical data. If necessary, results should be confirmed by a molecular assay or viral culture.   CBC with platelets and differential     Status: None    Narrative    The following orders were created for panel order CBC with platelets and differential.  Procedure                               Abnormality         Status                     ---------                               -----------         ------                     CBC with platelets and d...[[id number]]                      Final result                 Please view results for these tests on the individual orders.